# Patient Record
Sex: FEMALE | Race: WHITE | NOT HISPANIC OR LATINO | Employment: FULL TIME | URBAN - METROPOLITAN AREA
[De-identification: names, ages, dates, MRNs, and addresses within clinical notes are randomized per-mention and may not be internally consistent; named-entity substitution may affect disease eponyms.]

---

## 2019-03-14 ENCOUNTER — APPOINTMENT (OUTPATIENT)
Dept: GENERAL RADIOLOGY | Facility: HOSPITAL | Age: 34
End: 2019-03-14

## 2019-03-14 ENCOUNTER — HOSPITAL ENCOUNTER (EMERGENCY)
Facility: HOSPITAL | Age: 34
Discharge: HOME | End: 2019-03-14
Attending: EMERGENCY MEDICINE | Admitting: EMERGENCY MEDICINE

## 2019-03-14 VITALS
TEMPERATURE: 98.8 F | HEART RATE: 76 BPM | HEIGHT: 66 IN | BODY MASS INDEX: 20.89 KG/M2 | DIASTOLIC BLOOD PRESSURE: 59 MMHG | SYSTOLIC BLOOD PRESSURE: 114 MMHG | RESPIRATION RATE: 16 BRPM | WEIGHT: 130 LBS | OXYGEN SATURATION: 98 %

## 2019-03-14 DIAGNOSIS — S82.65XA CLOSED NONDISPLACED FRACTURE OF LATERAL MALLEOLUS OF LEFT FIBULA, INITIAL ENCOUNTER: Primary | ICD-10-CM

## 2019-03-14 PROCEDURE — 99283 EMERGENCY DEPT VISIT LOW MDM: CPT

## 2019-03-14 PROCEDURE — 73610 X-RAY EXAM OF ANKLE: CPT

## 2019-03-14 RX ORDER — ONDANSETRON 4 MG/1
4 TABLET, ORALLY DISINTEGRATING ORAL ONCE
Status: COMPLETED | OUTPATIENT
Start: 2019-03-14 | End: 2019-03-14

## 2019-03-14 RX ORDER — HYDROCODONE BITARTRATE AND ACETAMINOPHEN 5; 325 MG/1; MG/1
1 TABLET ORAL EVERY 6 HOURS PRN
Qty: 15 TABLET | Refills: 0 | Status: SHIPPED | OUTPATIENT
Start: 2019-03-14 | End: 2019-04-24 | Stop reason: SDUPTHER

## 2019-03-14 RX ORDER — HYDROCODONE BITARTRATE AND ACETAMINOPHEN 7.5; 325 MG/1; MG/1
1 TABLET ORAL ONCE
Status: COMPLETED | OUTPATIENT
Start: 2019-03-14 | End: 2019-03-14

## 2019-03-14 RX ADMIN — HYDROCODONE BITARTRATE AND ACETAMINOPHEN 1 TABLET: 7.5; 325 TABLET ORAL at 14:00

## 2019-03-14 RX ADMIN — ONDANSETRON 4 MG: 4 TABLET, ORALLY DISINTEGRATING ORAL at 14:00

## 2019-03-14 NOTE — ED PROVIDER NOTES
" EMERGENCY DEPARTMENT ENCOUNTER    CHIEF COMPLAINT  Chief Complaint: left ankle pain  History given by: patient  History limited by: none  Room Number: 07/07  PMD: Marcelo Angel CRNP      HPI:  Pt is a 34 y.o. female who presents complaining of L ankle pain that started one hour ago when she rolled her ankle. Pt states she was walking on a curb when she slipped off of it and rolled her L ankle. Pt states that she fell down, but denies hitting her head. Pt states that she has been able to \"hobble\" on the ankle since she injured it. Pt denies any numbness or tingling in the L leg.     Duration:  One hour  Onset: sudden  Timing: constant  Location: L ankle  Radiation: none  Quality: L ankle pain  Intensity/Severity: moderate  Progression: unchanged  Associated Symptoms: none  Aggravating Factors: placing weight on the ankle  Alleviating Factors: rest  Previous Episodes: none  Treatment before arrival: none    PAST MEDICAL HISTORY  Active Ambulatory Problems     Diagnosis Date Noted   • No Active Ambulatory Problems     Resolved Ambulatory Problems     Diagnosis Date Noted   • No Resolved Ambulatory Problems     Past Medical History:   Diagnosis Date   • Anxiety    • Migraine        PAST SURGICAL HISTORY  Past Surgical History:   Procedure Laterality Date   • DENTAL PROCEDURE         FAMILY HISTORY  History reviewed. No pertinent family history.    SOCIAL HISTORY  Social History     Socioeconomic History   • Marital status:      Spouse name: Not on file   • Number of children: Not on file   • Years of education: Not on file   • Highest education level: Not on file   Social Needs   • Financial resource strain: Not on file   • Food insecurity - worry: Not on file   • Food insecurity - inability: Not on file   • Transportation needs - medical: Not on file   • Transportation needs - non-medical: Not on file   Occupational History   • Not on file   Tobacco Use   • Smoking status: Never Smoker   Substance " and Sexual Activity   • Alcohol use: No     Frequency: Never   • Drug use: Defer   • Sexual activity: Defer   Other Topics Concern   • Not on file   Social History Narrative   • Not on file       ALLERGIES  Celexa [citalopram hydrobromide]; Fioricet [butalbital-apap-caffeine]; Lactose intolerance (gi); Macrobid [nitrofurantoin]; Other; Robaxin [methocarbamol]; Soma [carisoprodol]; Sulfa antibiotics; Sumatriptan; and Tramadol    REVIEW OF SYSTEMS  Review of Systems   Constitutional: Negative for fever.   HENT:        Pt denies hitting her head.    Respiratory: Negative for shortness of breath.    Cardiovascular: Negative for chest pain.   Musculoskeletal: Positive for arthralgias ( L ankle pain).   Neurological: Negative for numbness ( in the LLE).        Pt denies any tingling in the LLE.        PHYSICAL EXAM  ED Triage Vitals   Temp Heart Rate Resp BP SpO2   03/14/19 1334 03/14/19 1334 03/14/19 1334 03/14/19 1346 03/14/19 1334   98.8 °F (37.1 °C) 87 18 119/91 100 %      Temp src Heart Rate Source Patient Position BP Location FiO2 (%)   03/14/19 1334 03/14/19 1334 03/14/19 1346 03/14/19 1346 --   Tympanic Monitor Sitting Right arm        Physical Exam   Constitutional: She is oriented to person, place, and time. No distress.   Eyes: EOM are normal.   Neck: Normal range of motion.   Cardiovascular: Normal rate and regular rhythm.   Pulmonary/Chest: Effort normal and breath sounds normal. No respiratory distress.   Musculoskeletal:   There is no fibular head tenderness to the RLE.   LLE: The lateral ankle is swollen and tender. The calcaneus and foot are non-tender.    Neurological: She is alert and oriented to person, place, and time. She has normal sensation and normal strength.   Pt is NVI distally. Pt has good sensation in both feet bilaterally.    Skin: Skin is warm and dry.   Psychiatric: Affect normal.   Nursing note and vitals reviewed.        RADIOLOGY  XR Ankle 3+ View Left   Final Result       Fracture of  the distal fibula.       This report was finalized on 3/14/2019 2:50 PM by Dr. Sebastián Turner M.D.               I ordered the above noted radiological studies. Interpreted by radiologist. Reviewed by me in PACS.       PROCEDURES  Procedures    Splint Application:  Splint Type: short leg posterior ortho glass splint  Indication: fx  Splint placed by ER Physician  Post splint application:   1) neurovascularly intact   2) good position  Discussed splint care with patient  Discussed PMD/orthopedic follow up      PROGRESS AND CONSULTS     1354 Norco and Zofran-ODT ordered for pain management. XR L Ankle ordered for further evaluation.     1445 Rechecked pt. Pt is resting comfortably. Notified pt she has a fx to her L distal fibula. Discussed the plan to put the pt in a splint, pt agreed to the plan. I put the pt's LLE in a splint. Pt tolerated the procedure well with no complications. Discussed the plan to discharge the pt home with prescriptions for Norco. I instructed the pt to follow up with Dr. Boyce as soon as possible for an appointment. Pt understands and agrees with the plan, all questions answered.    MEDICAL DECISION MAKING  Results were reviewed/discussed with the patient and they were also made aware of online access. Pt also made aware that some labs, such as cultures, will not be resulted during ER visit and follow up with PMD is necessary.     MDM  Number of Diagnoses or Management Options  Closed nondisplaced fracture of lateral malleolus of left fibula, initial encounter:      Amount and/or Complexity of Data Reviewed  Tests in the radiology section of CPT®: reviewed and ordered (XR L ankle - Fracture of the distal fibula)  Independent visualization of images, tracings, or specimens: yes           DIAGNOSIS  Final diagnoses:   Closed nondisplaced fracture of lateral malleolus of left fibula, initial encounter       DISPOSITION  DISCHARGE    Patient discharged in stable condition.    Reviewed  implications of results, diagnosis, meds, responsibility to follow up, warning signs and symptoms of possible worsening, potential complications and reasons to return to ER.    Patient/Family voiced understanding of above instructions.    Discussed plan for discharge, as there is no emergent indication for admission. Patient referred to primary care provider for BP management due to today's BP. Pt/family is agreeable and understands need for follow up and repeat testing.  Pt is aware that discharge does not mean that nothing is wrong but it indicates no emergency is present that requires admission and they must continue care with follow-up as given below or physician of their choice.     FOLLOW-UP  Kervin Boyce MD  [address]  [place] [address] [phone]    Schedule an appointment as soon as possible for a visit       [facility] Emergency Department  [address] [address] [phone]    As needed         Medication List      New Prescriptions    HYDROcodone-acetaminophen 5-325 MG per tablet  Commonly known as:  NORCO  Take 1 tablet by mouth Every 6 (Six) Hours As Needed for Moderate Pain .              Latest Documented Vital Signs:  As of 3:00 PM  BP- 119/91 HR- 87 Temp- 98.8 °F (37.1 °C) (Tympanic) O2 sat- 100%    --  Documentation assistance provided by clovis Camejo for Dr. Weldon.  Information recorded by the scribe was done at my direction and has been verified and validated by me.     Juan Camejo  03/14/19 9841       Ruel Weldon MD  03/14/19 8195

## 2019-03-15 ENCOUNTER — OFFICE VISIT (OUTPATIENT)
Dept: ORTHOPEDIC SURGERY | Facility: CLINIC | Age: 34
End: 2019-03-15

## 2019-03-15 VITALS — WEIGHT: 130 LBS | TEMPERATURE: 97.9 F | HEIGHT: 66 IN | BODY MASS INDEX: 20.89 KG/M2

## 2019-03-15 DIAGNOSIS — S82.892A CLOSED FRACTURE OF LEFT ANKLE, INITIAL ENCOUNTER: Primary | ICD-10-CM

## 2019-03-15 PROCEDURE — 99204 OFFICE O/P NEW MOD 45 MIN: CPT | Performed by: ORTHOPAEDIC SURGERY

## 2019-03-15 PROCEDURE — 27786 TREATMENT OF ANKLE FRACTURE: CPT | Performed by: ORTHOPAEDIC SURGERY

## 2019-03-15 RX ORDER — HYDROCODONE BITARTRATE AND ACETAMINOPHEN 5; 325 MG/1; MG/1
1 TABLET ORAL EVERY 4 HOURS PRN
Qty: 30 TABLET | Refills: 0 | Status: SHIPPED | OUTPATIENT
Start: 2019-03-15 | End: 2020-01-07

## 2019-03-15 NOTE — PROGRESS NOTES
History & Physical       Patient: Alda Durham    YOB: 1985    Medical Record Number: [id number]    Chief Complaints: Left ankle injury    History of Present Illness: 34 y.o. female presents for evaluation of the left ankle.  The injury was sustained yesterday.  She was walking [place] [employer] and twisted her ankle.  She was seen in the emergency room and subsequently referred here.  She reports good use and function of the ankle prior to the injury.  She does not typically walk with any assist device.  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  Current pain is described as moderate, constant, and aching.  Pain is worse with attempted movement or weightbearing.  Rest, the splint, and pain medicine and have all helped.      Allergies:   Allergies   Allergen Reactions   • Celexa [Citalopram Hydrobromide] Swelling   • Fioricet [Butalbital-Apap-Caffeine] Swelling   • Lactose Intolerance (Gi) GI Intolerance   • Macrobid [Nitrofurantoin] Swelling   • Other Swelling     Muscle relaxers   • Robaxin [Methocarbamol] Swelling   • Soma [Carisoprodol] Swelling   • Sulfa Antibiotics Swelling   • Sumatriptan Swelling   • Tramadol Swelling       Home Medications:      Current Outpatient Medications:   •  HYDROcodone-acetaminophen (NORCO) 5-325 MG per tablet, Take 1 tablet by mouth Every 6 (Six) Hours As Needed for Moderate Pain ., Disp: 15 tablet, Rfl: 0  •  HYDROcodone-acetaminophen (NORCO) 5-325 MG per tablet, Take 1 tablet by mouth Every 4 (Four) Hours As Needed for Moderate Pain ., Disp: 30 tablet, Rfl: 0  No current facility-administered medications for this visit.     Past Medical History:   Diagnosis Date   • Anxiety    • Migraine           Past Surgical History:   Procedure Laterality Date   • DENTAL PROCEDURE            Social History     Occupational History   • Not on file   Tobacco Use   • Smoking status: Never Smoker   Substance and Sexual Activity   •  "Alcohol use: No     Frequency: Never   • Drug use: Defer   • Sexual activity: Defer      Social History     Social History Narrative   • Not on file        History reviewed. No pertinent family history.    Review of Systems:      Constitutional: Denies fever, shaking or chills   Eyes: Denies change in visual acuity   HEENT: Denies nasal congestion or sore throat   Respiratory: Denies cough or shortness of breath   Cardiovascular: Denies chest pain or edema  Endocrine: Denies tremors, palpitations, intolerance of heat or cold, polyuria, polydipsia.  GI: Denies abdominal pain, nausea, vomiting, bloody stools or diarrhea  : Denies frequency, urgency, incontinence, retention, or nocturia.  Musculoskeletal: Denies numbness tingling or loss of motor function except as above  Integument: Denies rash, lesion or ulceration   Neurologic: Denies headache or focal weakness, deficits  Heme: Denies epistaxis, spontaneous or excessive bleeding, epistaxis, hematuria, melena, fatigue, enlarged or tender lymph nodes.      All other pertinent positives and negatives as noted above in HPI.    Physical Exam: 34 y.o. female    Vitals:    03/15/19 0913   Temp: 97.9 °F (36.6 °C)   Weight: 59 kg (130 lb)   Height: 167.6 cm (66\")       General:  Patient is awake and alert.  Appears in no acute distress or discomfort.    Psych:  Affect and demeanor are appropriate.    Eyes:  Conjunctiva and sclera appear grossly normal.  Eyes track well and EOM seem to be intact.    Dentition:  No gross abnormalities noted.    Ears:  No gross abnormalities.  Hearing adequate for the exam.    Cardiovascular:  Regular rate and rhythm.    Lungs:  Good chest expansion.  Breathing unlabored.    Lymph:  No palpable masses or adenopathy in the affected extremity    Left lower extremity:  Splint was in place and removed.  There is diffuse edema laterally. Skin appears benign.  No lacerations or abrasions.  Focal tenderness noted over the lateral malleolus.  There " are no palpable step-offs.  No tenderness along the medial side of the ankle, upper leg or knee.  No palpable masses or adenopathy.  Compartments soft in the calf and foot.  Painful, limited ROM of the ankle.  Could not assess stability due to discomfort with motion.  Good strength in the toes with plantar flexion and dorsiflexion albeit with discomfort.  Intact sensation.  Brisk cap refill.  Palpable dorsalis pedis pulse.  Toes are pink and warm.    Diagnostic Tests:  No results found for: GLUCOSE, CALCIUM, NA, K, CO2, CL, BUN, CREATININE, EGFRIFAFRI, EGFRIFNONA, BCR, ANIONGAP  No results found for: WBC, HGB, HCT, MCV, PLT  No results found for: INR, PROTIME    Imaging:  Outside AP, mortise and lateral views of the left ankle are reviewed along with the associated report.  There is a Lee A type lateral malleolar fracture.  The mortise is symmetric.  No medial clear space widening.    Assessment:  Left ankle lateral malleolar fracture    Plan:  We had a thorough discussion regarding the treatment options.  This appears to be a stable injury which should be amenable to conservative treatment.  I have recommended a boot for comfort.  The injury is stable to allow partial weightbearing.  No prolonged standing or walking.  I gave her a work note to keep her out of work for now..  I want to reconvene in approximately 7-10 days for repeat x-rays.  I did agree to refill her hydrocodone prescription.  Risks were discussed.    Date: 3/15/2019    Kervin Boyce MD

## 2019-03-26 ENCOUNTER — OFFICE VISIT (OUTPATIENT)
Dept: OBSTETRICS AND GYNECOLOGY | Facility: CLINIC | Age: 34
End: 2019-03-26

## 2019-03-26 VITALS
DIASTOLIC BLOOD PRESSURE: 82 MMHG | WEIGHT: 130 LBS | BODY MASS INDEX: 20.89 KG/M2 | HEIGHT: 66 IN | SYSTOLIC BLOOD PRESSURE: 118 MMHG

## 2019-03-26 DIAGNOSIS — Z11.3 SCREENING EXAMINATION FOR STD (SEXUALLY TRANSMITTED DISEASE): ICD-10-CM

## 2019-03-26 DIAGNOSIS — Z01.419 WELL WOMAN EXAM WITH ROUTINE GYNECOLOGICAL EXAM: Primary | ICD-10-CM

## 2019-03-26 LAB
B-HCG UR QL: NEGATIVE
BILIRUB BLD-MCNC: NEGATIVE MG/DL
CLARITY, POC: CLEAR
COLOR UR: YELLOW
GLUCOSE UR STRIP-MCNC: NEGATIVE MG/DL
INTERNAL NEGATIVE CONTROL: NEGATIVE
INTERNAL POSITIVE CONTROL: POSITIVE
KETONES UR QL: NEGATIVE
LEUKOCYTE EST, POC: NEGATIVE
Lab: NORMAL
NITRITE UR-MCNC: NEGATIVE MG/ML
PH UR: 5 [PH] (ref 5–8)
PROT UR STRIP-MCNC: NEGATIVE MG/DL
RBC # UR STRIP: NEGATIVE /UL
SP GR UR: 1 (ref 1–1.03)
UROBILINOGEN UR QL: NORMAL

## 2019-03-26 PROCEDURE — 81002 URINALYSIS NONAUTO W/O SCOPE: CPT | Performed by: OBSTETRICS & GYNECOLOGY

## 2019-03-26 PROCEDURE — 99385 PREV VISIT NEW AGE 18-39: CPT | Performed by: OBSTETRICS & GYNECOLOGY

## 2019-03-26 PROCEDURE — 81025 URINE PREGNANCY TEST: CPT | Performed by: OBSTETRICS & GYNECOLOGY

## 2019-03-26 NOTE — PROGRESS NOTES
GYN Annual Exam     CC- Here for annual exam.     Alda Durham is a 34 y.o. female who presents for annual well woman exam. Periods are regular every 28-30 days, lasting 5 days. Dysmenorrhea:mild, occurring first 1-2 days of flow. Cyclic symptoms include none. No intermenstrual bleeding, spotting, or discharge.  Patient is sexually active  yes - recently  . Patient is satisfied with her contraception pt is interested in Paragard IUD. Pt requesting STD testing-her  was unfaithful.    OB History     No data available        G0    Current contraception: condoms  History of abnormal Pap smear: no  History of abnormal mammogram: no  Family history of uterine, colon or ovarian cancer: no  Family history of breast cancer: yes - maternal GM over age 50.    Health Maintenance   Topic Date Due   • ANNUAL PHYSICAL  01/22/1986   • TDAP/TD VACCINES (1 - Tdap) 01/22/2002   • INFLUENZA VACCINE  08/01/2018   • PAP SMEAR  03/15/2019       Past Medical History:   Diagnosis Date   • Anxiety    • Migraine        Past Surgical History:   Procedure Laterality Date   • DENTAL PROCEDURE           Current Outpatient Medications:   •  HYDROcodone-acetaminophen (NORCO) 5-325 MG per tablet, Take 1 tablet by mouth Every 6 (Six) Hours As Needed for Moderate Pain ., Disp: 15 tablet, Rfl: 0  •  HYDROcodone-acetaminophen (NORCO) 5-325 MG per tablet, Take 1 tablet by mouth Every 4 (Four) Hours As Needed for Moderate Pain ., Disp: 30 tablet, Rfl: 0    Allergies   Allergen Reactions   • Celexa [Citalopram Hydrobromide] Swelling   • Fioricet [Butalbital-Apap-Caffeine] Swelling   • Lactose Intolerance (Gi) GI Intolerance   • Macrobid [Nitrofurantoin] Swelling   • Other Swelling     Muscle relaxers   • Robaxin [Methocarbamol] Swelling   • Soma [Carisoprodol] Swelling   • Sulfa Antibiotics Swelling   • Sumatriptan Swelling   • Tramadol Swelling       Social History     Tobacco Use   • Smoking status: Never Smoker   Substance Use Topics   •  "Alcohol use: No     Frequency: Never   • Drug use: Defer       History reviewed. No pertinent family history.    Review of Systems   Constitutional: Negative for unexpected weight change.   Gastrointestinal: Negative for abdominal distention and abdominal pain.   Genitourinary: Negative for dyspareunia, menstrual problem, pelvic pain, vaginal bleeding, vaginal discharge and vaginal pain.       /82   Ht 167.6 cm (66\")   Wt 59 kg (130 lb)   LMP 03/07/2019   BMI 20.98 kg/m²     Physical Exam   Constitutional: She is oriented to person, place, and time. She appears well-developed and well-nourished.   HENT:   Mouth/Throat: Normal dentition. No dental caries.   Cardiovascular: Normal rate and regular rhythm.   Pulmonary/Chest: Effort normal and breath sounds normal. Right breast exhibits no inverted nipple, no mass, no nipple discharge, no skin change and no tenderness. Left breast exhibits no inverted nipple, no mass, no nipple discharge, no skin change and no tenderness.   Abdominal: Soft. She exhibits no distension and no mass. There is no tenderness.   Genitourinary: There is no rash, tenderness or lesion on the right labia. There is no rash, tenderness or lesion on the left labia. Uterus is not deviated, not enlarged, not fixed and not tender. Cervix exhibits no motion tenderness, no discharge and no friability. Right adnexum displays no mass, no tenderness and no fullness. Left adnexum displays no mass, no tenderness and no fullness. No tenderness or bleeding in the vagina. No vaginal discharge found.   Neurological: She is alert and oriented to person, place, and time.   Psychiatric: She has a normal mood and affect. Her behavior is normal. Judgment and thought content normal.   Vitals reviewed.         Assessment/Plan    1) GYN HM: Check pap smear. SBE demonstrated and encouraged.  2) STD screening: Check full panel  3) Contraception: Interested in Paragard  4) Family Planning: Not interested in " children.  5) Diet and Exercise discussed  6) Smoking Status: nonsmoker.   7) Social: Recently  and moved here from [place].  was unfaithful: was communicating with a she-male. Pt's mother is my patient.  8) Follow up prn and 1 year       Diagnoses and all orders for this visit:    Well woman exam with routine gynecological exam  -     POC Urinalysis Dipstick  -     POC Pregnancy, Urine  -     RPR, Rfx Qn RPR / Confirm TP  -     Hepatitis B Surface Antigen  -     Hepatitis C Antibody  -     HIV-1 / O / 2 Ag / Antibody 4th Generation  -     HSV 1 & 2 - Specific Antibody, IgG  -     PapIG, CtNgTv, HPV, Rfx 16 / 18    Screening examination for STD (sexually transmitted disease)          Payal Omalley DO  3/27/2019  1:29 PM

## 2019-03-27 ENCOUNTER — TELEPHONE (OUTPATIENT)
Dept: OBSTETRICS AND GYNECOLOGY | Facility: CLINIC | Age: 34
End: 2019-03-27

## 2019-03-27 LAB
HBV SURFACE AG SERPL QL IA: NEGATIVE
HCV AB S/CO SERPL IA: 0.1 S/CO RATIO (ref 0–0.9)
HIV 1+2 AB+HIV1 P24 AG SERPL QL IA: NORMAL
HSV1 IGG SER IA-ACNC: <0.91 INDEX (ref 0–0.9)
HSV2 IGG SER IA-ACNC: <0.91 INDEX (ref 0–0.9)
REQUEST PROBLEM: NORMAL
RPR SER QL: NON REACTIVE

## 2019-03-29 LAB
C TRACH RRNA CVX QL NAA+PROBE: NEGATIVE
CYTOLOGIST CVX/VAG CYTO: NORMAL
CYTOLOGY CVX/VAG DOC THIN PREP: NORMAL
DX ICD CODE: NORMAL
HIV 1 & 2 AB SER-IMP: NORMAL
HPV I/H RISK 1 DNA CVX QL PROBE+SIG AMP: NEGATIVE
Lab: NORMAL
N GONORRHOEA RRNA CVX QL NAA+PROBE: NEGATIVE
OTHER STN SPEC: NORMAL
PATH REPORT.FINAL DX SPEC: NORMAL
STAT OF ADQ CVX/VAG CYTO-IMP: NORMAL
T VAGINALIS RRNA SPEC QL NAA+PROBE: NEGATIVE

## 2019-04-03 ENCOUNTER — OFFICE VISIT (OUTPATIENT)
Dept: ORTHOPEDIC SURGERY | Facility: CLINIC | Age: 34
End: 2019-04-03

## 2019-04-03 VITALS — HEIGHT: 66 IN | BODY MASS INDEX: 20.89 KG/M2 | TEMPERATURE: 98.5 F | WEIGHT: 130 LBS

## 2019-04-03 DIAGNOSIS — Z09 FRACTURE FOLLOW-UP: Primary | ICD-10-CM

## 2019-04-03 PROCEDURE — 73610 X-RAY EXAM OF ANKLE: CPT | Performed by: ORTHOPAEDIC SURGERY

## 2019-04-03 PROCEDURE — 99024 POSTOP FOLLOW-UP VISIT: CPT | Performed by: ORTHOPAEDIC SURGERY

## 2019-04-08 ENCOUNTER — TELEPHONE (OUTPATIENT)
Dept: ORTHOPEDIC SURGERY | Facility: CLINIC | Age: 34
End: 2019-04-08

## 2019-04-08 NOTE — TELEPHONE ENCOUNTER
Patient needs a detailed note for her employer regarding limitations if any and how many hrs a day that she is able to work. Letter needs to be faxed to attention: Felicia Nagy @ [phone]. Please call patient when letter has been faxed.

## 2019-04-08 NOTE — TELEPHONE ENCOUNTER
Sit down job only.  No standing or walking more than 30 minutes/day.  She can work up to 8 hours/day.  No driving.

## 2019-04-11 ENCOUNTER — LAB (OUTPATIENT)
Dept: OBSTETRICS AND GYNECOLOGY | Facility: CLINIC | Age: 34
End: 2019-04-11

## 2019-04-11 DIAGNOSIS — Z20.2 POSSIBLE EXPOSURE TO STD: Primary | ICD-10-CM

## 2019-04-11 DIAGNOSIS — Z11.3 SCREENING EXAMINATION FOR STD (SEXUALLY TRANSMITTED DISEASE): ICD-10-CM

## 2019-04-11 DIAGNOSIS — Z01.419 WELL WOMAN EXAM WITH ROUTINE GYNECOLOGICAL EXAM: ICD-10-CM

## 2019-04-12 LAB — HIV 1+2 AB+HIV1 P24 AG SERPL QL IA: NON REACTIVE

## 2019-04-24 ENCOUNTER — OFFICE VISIT (OUTPATIENT)
Dept: ORTHOPEDIC SURGERY | Facility: CLINIC | Age: 34
End: 2019-04-24

## 2019-04-24 VITALS — BODY MASS INDEX: 20.89 KG/M2 | WEIGHT: 130 LBS | TEMPERATURE: 98.2 F | HEIGHT: 66 IN

## 2019-04-24 DIAGNOSIS — Z09 FRACTURE FOLLOW-UP: Primary | ICD-10-CM

## 2019-04-24 PROCEDURE — 73610 X-RAY EXAM OF ANKLE: CPT | Performed by: ORTHOPAEDIC SURGERY

## 2019-04-24 PROCEDURE — 99024 POSTOP FOLLOW-UP VISIT: CPT | Performed by: ORTHOPAEDIC SURGERY

## 2019-04-24 NOTE — PROGRESS NOTES
"Alda Durham : 1985 MRN: [id number] DATE: 2019    CC:  6 weeks s/p closed treatment of left ankle fracture    HPI:  Pt. returns to clinic today stating pain is improved.  Reports compliance with use of the boot.    Vitals:    19 0901   Temp: 98.2 °F (36.8 °C)   TempSrc: Temporal   Weight: 59 kg (130 lb)   Height: 167.6 cm (66\")        Exam:  Skin is benign.  No wounds.  Calf soft.  Negative Sonia's sign.  Mild tenderness over lateral malleolus.  Good motor and sensory function distally in foot.  Palpable pulses with good cap refill.      Imaging:  3v X-rays of left ankle including AP, lateral and mortise views are ordered and reviewed by me to evaluate alignment and for comparison purposes. Alignment is well maintained.  No concerning findings.    Impression:  6 weeks s/p closed treatment of left ankle fracture    Plan:  I had hoped to fit her with an Aircast today.  Unfortunately, we are out of those in the office here.  I will have Angelito reach out to her about getting her fitted for that.  She can continue to bear weight as tolerated and can transition to regular shoes with the Aircast.  I will see her back in 1 month.  "

## 2019-05-22 ENCOUNTER — OFFICE VISIT (OUTPATIENT)
Dept: ORTHOPEDIC SURGERY | Facility: CLINIC | Age: 34
End: 2019-05-22

## 2019-05-22 VITALS — BODY MASS INDEX: 22.08 KG/M2 | HEIGHT: 66 IN | WEIGHT: 137.4 LBS | TEMPERATURE: 98.9 F

## 2019-05-22 DIAGNOSIS — S82.892D CLOSED FRACTURE OF LEFT ANKLE WITH ROUTINE HEALING, SUBSEQUENT ENCOUNTER: ICD-10-CM

## 2019-05-22 DIAGNOSIS — Z09 FRACTURE FOLLOW-UP: Primary | ICD-10-CM

## 2019-05-22 PROCEDURE — 73610 X-RAY EXAM OF ANKLE: CPT | Performed by: NURSE PRACTITIONER

## 2019-05-22 PROCEDURE — 99024 POSTOP FOLLOW-UP VISIT: CPT | Performed by: NURSE PRACTITIONER

## 2019-05-23 NOTE — PROGRESS NOTES
Alda Durham : 1985 MRN: [id number] DATE: 2019    CC:  2 months s/p closed treatment left ankle fracture    HPI: Pt. returns to clinic today stating pain is much better.  Motion is progressing.  Denies any new concerns or issues.  Reports she was not able to use the brace provided in her last visit due to discomfort.    Vitals:    19 1118   Temp: 98.9 °F (37.2 °C)       Current Outpatient Medications:   •  Vortioxetine HBr (TRINTELLIX) 10 MG tablet, , Disp: , Rfl:   •  HYDROcodone-acetaminophen (NORCO) 5-325 MG per tablet, Take 1 tablet by mouth Every 4 (Four) Hours As Needed for Moderate Pain ., Disp: 30 tablet, Rfl: 0    Past Medical History:   Diagnosis Date   • Anxiety    • Asthma    • CTS (carpal tunnel syndrome)    • Fractures    • Migraine        Past Surgical History:   Procedure Laterality Date   • DENTAL PROCEDURE         Family History   Problem Relation Age of Onset   • Cancer Maternal Grandmother    • Osteoporosis Maternal Grandmother    • Clotting disorder Mother         DVT   • Diabetes Paternal Grandmother    • Diabetes Paternal Grandfather        Social History     Socioeconomic History   • Marital status: Single     Spouse name: Not on file   • Number of children: Not on file   • Years of education: Not on file   • Highest education level: Not on file   Tobacco Use   • Smoking status: Former Smoker     Years: 1.00     Start date: 2001     Last attempt to quit: 2002     Years since quittin.3   • Smokeless tobacco: Never Used   • Tobacco comment: Social smoker   Substance and Sexual Activity   • Alcohol use: No     Frequency: Never   • Drug use: No   • Sexual activity: Yes     Partners: Male     Birth control/protection: Coitus interruptus, Condom, Sponge       Exam:  Contour of ankle appears normal.  No significant tenderness.  Ankle moves fluidly--some stiffness with dorsiflexion.  Good motor and sensory function distally.  Palpable pulses with good  cap refill.      Imaging:  Dr. Boyce and I reviewed the x-rays together.  3v xrays left ankle including AP, lateral and mortise views are ordered and reviewed by me to evaluate alignment and for comparison purposes. There has been interval callus formation laterally.  Alignment is well maintained.    Impression:  2 months s/p closed treatment left ankle fracture    Plan:    1.  Progress ROM and strengthening as tolerated.  2.  Encouraged the patient that residual soreness and swelling may persist up to 6 months after surgery.  3.  I have entered a referral for physical therapy.   4.  Follow up as needed--told patient to call if any problems or concerns arise.    Yelitza Sin, APRN    05/22/2019

## 2020-01-07 ENCOUNTER — OFFICE VISIT (OUTPATIENT)
Dept: OBSTETRICS AND GYNECOLOGY | Facility: CLINIC | Age: 35
End: 2020-01-07

## 2020-01-07 VITALS
HEIGHT: 66 IN | DIASTOLIC BLOOD PRESSURE: 82 MMHG | BODY MASS INDEX: 22.02 KG/M2 | WEIGHT: 137 LBS | SYSTOLIC BLOOD PRESSURE: 120 MMHG

## 2020-01-07 DIAGNOSIS — Z30.9 ENCOUNTER FOR CONTRACEPTIVE MANAGEMENT, UNSPECIFIED TYPE: Primary | ICD-10-CM

## 2020-01-07 DIAGNOSIS — Z13.9 SCREENING FOR CONDITION: ICD-10-CM

## 2020-01-07 PROBLEM — G43.109 MIGRAINE WITH AURA: Status: ACTIVE | Noted: 2020-01-07

## 2020-01-07 LAB
B-HCG UR QL: NEGATIVE
BILIRUB BLD-MCNC: NEGATIVE MG/DL
CLARITY, POC: CLEAR
COLOR UR: YELLOW
GLUCOSE UR STRIP-MCNC: NEGATIVE MG/DL
INTERNAL NEGATIVE CONTROL: NEGATIVE
INTERNAL POSITIVE CONTROL: POSITIVE
KETONES UR QL: NEGATIVE
LEUKOCYTE EST, POC: NEGATIVE
Lab: 55
NITRITE UR-MCNC: NEGATIVE MG/ML
PH UR: 5 [PH] (ref 5–8)
PROT UR STRIP-MCNC: NEGATIVE MG/DL
RBC # UR STRIP: NEGATIVE /UL
SP GR UR: 1 (ref 1–1.03)
UROBILINOGEN UR QL: NORMAL

## 2020-01-07 PROCEDURE — 81002 URINALYSIS NONAUTO W/O SCOPE: CPT | Performed by: OBSTETRICS & GYNECOLOGY

## 2020-01-07 PROCEDURE — 81025 URINE PREGNANCY TEST: CPT | Performed by: OBSTETRICS & GYNECOLOGY

## 2020-01-07 PROCEDURE — 99213 OFFICE O/P EST LOW 20 MIN: CPT | Performed by: OBSTETRICS & GYNECOLOGY

## 2020-01-07 RX ORDER — ACETAMINOPHEN AND CODEINE PHOSPHATE 120; 12 MG/5ML; MG/5ML
1 SOLUTION ORAL DAILY
Qty: 28 TABLET | Refills: 12 | Status: SHIPPED | OUTPATIENT
Start: 2020-01-07 | End: 2020-03-09

## 2020-01-07 NOTE — PROGRESS NOTES
"PROBLEM VISIT    Chief Complaint: contraception      Alda MCCORMICK is a 34 y.o. patient who presents to discuss contraception. Pt has a diagnosis of migraine with aura- she takes trintellix daily. Pt desired paragard IUD but insurance does not cover it. Her insurance does call progesterone IUDs.  Patient is concerned about starting progesterone as she is worried is going to start a migraine.  She is been on the Tri-Cyclen low in the past and it exacerbated her migraines.  Patient is not currently sexually active.  She moved here approximately 2 years ago and is now .  Her  had a vasectomy.  Patient does not have any children and she plans to start dating and does not desire pregnancy.  Chief Complaint   Patient presents with   • Consult     BC             The following portions of the patient's history were reviewed and updated as appropriate: allergies, current medications and problem list.    Review of Systems   Constitutional: Negative for appetite change, chills, fatigue, fever and unexpected weight change.   Gastrointestinal: Negative for abdominal distention, abdominal pain, anal bleeding, blood in stool, constipation, diarrhea, nausea and vomiting.   Genitourinary: Negative for dyspareunia, dysuria, menstrual problem, pelvic pain, vaginal bleeding, vaginal discharge and vaginal pain.   Neurological: Positive for headaches.       /82   Ht 167.6 cm (65.98\")   Wt 62.1 kg (137 lb)   LMP 12/10/2019 (Exact Date)   Breastfeeding No   BMI 22.12 kg/m²     Physical Exam   Constitutional: She is oriented to person, place, and time. She appears well-developed and well-nourished. No distress.   Neurological: She is alert and oriented to person, place, and time. No cranial nerve deficit. Coordination normal.   Skin: She is not diaphoretic.   Psychiatric: She has a normal mood and affect. Her behavior is normal. Judgment and thought content normal.   Vitals reviewed.        Assessment/Plan   Alda " was seen today for consult.    Diagnoses and all orders for this visit:    Encounter for contraceptive management, unspecified type    Screening for condition  -     POC Urinalysis Dipstick  -     POC Pregnancy, Urine    Other orders  -     norethindrone (MICRONOR) 0.35 MG tablet; Take 1 tablet by mouth Daily.    33yo for contraception management    1) Contraception: Patient is currently on no contraception.  She desires a ParaGard IUD but her insurance company would not cover it.  Her insurance company does cover progesterone secreting IUDs.  Patient has a history of migraine with aura and she does not know she can tolerate progesterone only devices.  Will start patient on Micronor with her next cycle to see if she is a candidate for a progesterone secreting IUD.  Patient will notify me if she hires to proceed with an IUD.  She was given information about Tabby, Kyleena, Mirena.  If patient does not tolerate the Micronor then will discuss with her insurance company about trying to cover the ParaGard IUD as she is not a candidate for any sort of hormone contraception.    2) gyn HM: [facility] 3/2019             No follow-ups on file.      Payal Omalley DO    1/7/2020  10:16 AM

## 2020-02-02 ENCOUNTER — TELEPHONE (OUTPATIENT)
Dept: OBSTETRICS AND GYNECOLOGY | Facility: CLINIC | Age: 35
End: 2020-02-02

## 2020-02-02 NOTE — TELEPHONE ENCOUNTER
This pt wants an IUD (Tabby) but has a severe casein allergy. Can we call the rep for the IUD and find out if this device has casein?

## 2020-02-05 ENCOUNTER — TELEPHONE (OUTPATIENT)
Dept: OBSTETRICS AND GYNECOLOGY | Facility: CLINIC | Age: 35
End: 2020-02-05

## 2020-02-11 NOTE — TELEPHONE ENCOUNTER
She called back and gave me a website to go to and submit my question. This has been submitted and waiting on a reply back.

## 2020-02-14 NOTE — TELEPHONE ENCOUNTER
There is no Casein in the CoworkingON device. I will scan the information provided to me for that. I called the patient and left her that information on her voice mail and she could call and schedule an appt.

## 2020-03-09 ENCOUNTER — OFFICE VISIT (OUTPATIENT)
Dept: OBSTETRICS AND GYNECOLOGY | Facility: CLINIC | Age: 35
End: 2020-03-09

## 2020-03-09 VITALS
BODY MASS INDEX: 22.18 KG/M2 | SYSTOLIC BLOOD PRESSURE: 124 MMHG | DIASTOLIC BLOOD PRESSURE: 76 MMHG | HEIGHT: 66 IN | WEIGHT: 138 LBS

## 2020-03-09 DIAGNOSIS — Z30.430 ENCOUNTER FOR IUD INSERTION: Primary | ICD-10-CM

## 2020-03-09 PROCEDURE — 58300 INSERT INTRAUTERINE DEVICE: CPT | Performed by: OBSTETRICS & GYNECOLOGY

## 2020-03-09 RX ORDER — FEXOFENADINE HCL AND PSEUDOEPHEDRINE HCI 180; 240 MG/1; MG/1
1 TABLET, EXTENDED RELEASE ORAL DAILY
COMMUNITY

## 2020-03-09 NOTE — PROGRESS NOTES
Procedure: Intrauterine device insertion    Chief Complaint: IUD insertion    Procedures    Pre procedure indication 1) Desires LUIS  Post procedure indication 1) Desires same    The risks, benefits, and alternatives to IUD were explained at length with the patient. All her questions were answered and consents were signed.    The patient was placed in a dorsal lithotomy position on the examining table in [facility]. A bimanual exam confirmed the uterus was normal in size, anteverted. A warmed metal speculum was inserted into the vagina and the cervix was brought into view.    The cervix was prepped with Betadine. The anterior lip was grasped with a single-tooth tenaculum. The endometrial cavity was then sounded to 7 cm without use of a dilator.     The  was then carefully advanced to the cervical canal into the uterus to the level of the fundus. This was then backed off about 1.5-2 cm to allow sufficient space for the arms to open. The device was deployed. The  was removed carefully from the uterus. The threads were then cut leaving 2-3 cm visible outside of the cervix.  The single-tooth tenaculum was removed from the anterior lip. Good hemostasis was noted.     All other instruments were removed from the vagina.   There were no complications.  The patient tolerated the procedure well with a minimal amount of discomfort.    The patient was counseled about the need to return in 4 weeks for string check.     She was counseled about the need to use a backup method of contraception such as condoms for 1-2 weeks. The patient is counseled to contact us if she has any significant or increasing bleeding, pain, fever, chills, or other concerns. She is instructed to see a doctor right away if she believes that she may be pregnant at any time with the IUD in place.    Alda was seen today for constipation.    Diagnoses and all orders for this visit:    Encounter for IUD insertion        RTO Return in about 4  weeks (around 4/6/2020) for Recheck.    Luigi Berry MD    3/9/2020  12:30 PM

## 2020-03-13 ENCOUNTER — OFFICE VISIT (OUTPATIENT)
Dept: OBSTETRICS AND GYNECOLOGY | Facility: CLINIC | Age: 35
End: 2020-03-13

## 2020-03-13 VITALS
HEIGHT: 66 IN | WEIGHT: 138 LBS | SYSTOLIC BLOOD PRESSURE: 118 MMHG | DIASTOLIC BLOOD PRESSURE: 82 MMHG | BODY MASS INDEX: 22.18 KG/M2

## 2020-03-13 DIAGNOSIS — Z30.09 ENCOUNTER FOR COUNSELING REGARDING CONTRACEPTION: ICD-10-CM

## 2020-03-13 DIAGNOSIS — G43.109 MIGRAINE WITH AURA AND WITHOUT STATUS MIGRAINOSUS, NOT INTRACTABLE: ICD-10-CM

## 2020-03-13 DIAGNOSIS — Z30.432 ENCOUNTER FOR IUD REMOVAL: Primary | ICD-10-CM

## 2020-03-13 PROCEDURE — 58301 REMOVE INTRAUTERINE DEVICE: CPT | Performed by: NURSE PRACTITIONER

## 2020-03-13 PROCEDURE — 99213 OFFICE O/P EST LOW 20 MIN: CPT | Performed by: NURSE PRACTITIONER

## 2020-03-13 NOTE — PROGRESS NOTES
"Subjective     Chief Complaint   Patient presents with   • Follow-up     IUD removal       Alda MCCORMICK is a 35 y.o. No obstetric history on file. whose LMP is Patient's last menstrual period was 03/04/2020 (exact date). She is an established patient but new to me. She presents today for IUD removal. She reports she has a history of migraine with aura.  She desired the ParaGard IUD but her insurance would not cover so she tried the Tabby.  She states she got the Tabby IUD placed on Monday, March 9 and within 2 days she had what she described as a severe migraine. She reports her migraine has since resolved and this was not like a migraine she has had in the past. States she had aura as usual but her thought process was not normal. States she could not process words or thoughts as normal and she had (R) sided weakness.  She did not seek emergency care.  She is not current with a neuro.     She has recently tried the sponge, condoms, and this IUD. In the past she has been on OCPs in the past but failed d/t frequent migraines. She moved here approximately 2 years ago and is now .  Her previous  had a vasectomy.  Patient does not have any children and she plans to start dating and does not desire pregnancy.    HPI    HPI    The following portions of the patient's history were reviewed and updated as appropriate:vital signs, allergies, current medications, past medical history, past social history, past surgical history and problem list      Review of Systems     Review of Systems   Constitutional: Negative.    Respiratory: Negative.    Cardiovascular: Negative.    Gastrointestinal: Negative.    Genitourinary: Negative.    Musculoskeletal: Negative.    Skin: Negative.    Neurological: Positive for headache.       Objective      /82   Ht 167.6 cm (66\")   Wt 62.6 kg (138 lb)   LMP 03/04/2020 (Exact Date)   BMI 22.27 kg/m²     Physical Exam    Physical Exam   Constitutional: She is oriented to " person, place, and time. She appears well-developed and well-nourished.   Abdominal: Soft. Bowel sounds are normal. Hernia confirmed negative in the right inguinal area and confirmed negative in the left inguinal area.   Genitourinary: Rectum normal. Pelvic exam was performed with patient supine. There is no rash, tenderness, lesion or injury on the right labia. There is no rash, tenderness, lesion or injury on the left labia. Uterus is not deviated, not enlarged, not fixed and not tender. Cervix exhibits no motion tenderness, no discharge and no friability. Right adnexum displays no mass, no tenderness and no fullness. Left adnexum displays no mass, no tenderness and no fullness. No erythema, tenderness or bleeding in the vagina. No foreign body in the vagina. No signs of injury around the vagina. No vaginal discharge found.   Genitourinary Comments: Type of IUD:  Tabby  Date of insertion:  3/9/20  Reason for removal:  Side effect: Migraine  Other relevant history/information:  none    Procedure Time Out Documentation      Procedure Details  IUD strings visible:  yes  Local anesthesia:  None  Tenaculum used:  None  Removal:  IUD strings grasped and IUD removed intact with gentle traction.  The patient tolerated the procedure well.    All appropriate instructions regarding removal were reviewed.    Tolerated well  No apparent complications  Post procedure diagnosis : IUD removal     Plans for contraception:  no method    The patient was advised to call for any fever or for prolonged or severe pain or bleeding. She was advised to use NSAIDS as needed for mild to moderate pain.      Musculoskeletal: Normal range of motion. She exhibits no edema.   Lymphadenopathy:        Right: No inguinal adenopathy present.        Left: No inguinal adenopathy present.   Neurological: She is alert and oriented to person, place, and time.   Skin: Skin is warm and dry.   Psychiatric: She has a normal mood and affect. Her behavior is  normal.   Vitals reviewed.      Lab Review   Labs: Urine pregnancy test     Imaging   No data reviewed    Assessment  There are no diagnoses linked to this encounter.    Additional Assessment:   1. IUD removal  2. Contraception counseling    Plan     1. IUD removal- Pt tolerated well.  She understands to return to fertility is immediate.  She plans on condoms at this time.  2. Contraception counseling-she desires a ParaGard.  She has been on pills as well in the past in the past and her headaches worsen.  She is very interested in a nonhormonal form of contraception.  Her previous partner had a vasectomy but she is recently  and needs pregnancy prevention.  She does not desire children.  Discussed the possibility of tubal ligation with endometrial ablation.  Will we will try to write a letter of necessity to her insurance to see if they will cover the ParaGard for her.  3. Scheduled for: STD Labs - N/A , Ultrasound of the -  N/A , Mammography - N/A , Bone Density Test - N/A , Additional Labs - N/A  4. STD:  Enc condom use.   5. Smoking status: non smoker  6.   BMI: Patient's Body mass index is 22.27 kg/m². BMI is within normal parameters. No follow-up required..    RTO PRN     Geno Patrick, APRN  3/13/2020

## 2020-03-17 ENCOUNTER — TELEPHONE (OUTPATIENT)
Dept: OBSTETRICS AND GYNECOLOGY | Facility: CLINIC | Age: 35
End: 2020-03-17

## 2020-03-17 NOTE — TELEPHONE ENCOUNTER
I received notification from Geno that the patient is experiencing migraines with the Tabby IUD. Patient is interested in getting the Paragard IUD. I am checking benefits for the Paragard.

## 2020-03-20 ENCOUNTER — PATIENT MESSAGE (OUTPATIENT)
Dept: OBSTETRICS AND GYNECOLOGY | Facility: CLINIC | Age: 35
End: 2020-03-20

## 2020-03-23 ENCOUNTER — TELEPHONE (OUTPATIENT)
Dept: OBSTETRICS AND GYNECOLOGY | Facility: CLINIC | Age: 35
End: 2020-03-23

## 2020-03-23 NOTE — TELEPHONE ENCOUNTER
Paragard is not covered by medical or pharmacy benefits. I called the patient and left provided information on voice mail

## 2020-03-24 NOTE — TELEPHONE ENCOUNTER
Can we write a letter of medical necessity? She has failed multiple forms of contraception. She has migraine with aura and is not a candidate for estrogen. ( I know we have already talked about this but this is for the paper trail.)

## 2020-05-28 NOTE — PROGRESS NOTES
Alda Durham : 1985 MRN: [id number] DATE: 4/3/2019      CC:  2 weeks s/p closed treatment of left lateral malleolar fracture    HPI: Pt. returns to clinic today stating pain is improved.  Denies any new concerns or issues.  Her only complaint is that the boot is a little too tall    Vitals:    19 1509   Temp: 98.5 °F (36.9 °C)        Exam: Skin is benign.  Calf soft.  Negative Sonia's sign.  Mild tenderness over lateral malleolus.  Good motor and sensory function distally in foot.  Palpable pulses with good cap refill.      Imaging:  3 view X-rays of left ankle including AP, lateral and mortise views are ordered and reviewed by me to evaluate alignment and for comparison purposes.  No change in alignment.  Fracture remains nondisplaced.    Impression:  2 weeks s/p closed treatment of lateral malleolar fracture    Plan:    I fitted her with a smaller boot today.  She can bear weight as tolerated.  I gave her a work note for limited standing and walking.  I will see her back in 3 weeks.    Kervin Boyce MD           Completed proxy request

## 2021-04-16 ENCOUNTER — BULK ORDERING (OUTPATIENT)
Dept: CASE MANAGEMENT | Facility: OTHER | Age: 36
End: 2021-04-16

## 2021-04-16 DIAGNOSIS — Z23 IMMUNIZATION DUE: ICD-10-CM

## 2021-04-27 ENCOUNTER — OFFICE VISIT (OUTPATIENT)
Dept: OBSTETRICS AND GYNECOLOGY | Facility: CLINIC | Age: 36
End: 2021-04-27

## 2021-04-27 VITALS
SYSTOLIC BLOOD PRESSURE: 112 MMHG | HEIGHT: 63 IN | WEIGHT: 152.6 LBS | BODY MASS INDEX: 27.04 KG/M2 | DIASTOLIC BLOOD PRESSURE: 78 MMHG

## 2021-04-27 DIAGNOSIS — Z01.419 PAP SMEAR, LOW-RISK: ICD-10-CM

## 2021-04-27 DIAGNOSIS — Z11.51 ENCOUNTER FOR SCREENING FOR HUMAN PAPILLOMAVIRUS (HPV): ICD-10-CM

## 2021-04-27 DIAGNOSIS — Z01.419 ROUTINE GYNECOLOGICAL EXAMINATION: Primary | ICD-10-CM

## 2021-04-27 PROBLEM — Z30.09 ENCOUNTER FOR COUNSELING REGARDING CONTRACEPTION: Status: RESOLVED | Noted: 2020-03-13 | Resolved: 2021-04-27

## 2021-04-27 PROBLEM — Z30.432 ENCOUNTER FOR IUD REMOVAL: Status: RESOLVED | Noted: 2020-03-13 | Resolved: 2021-04-27

## 2021-04-27 PROBLEM — Z30.430 ENCOUNTER FOR IUD INSERTION: Status: RESOLVED | Noted: 2020-03-09 | Resolved: 2021-04-27

## 2021-04-27 PROCEDURE — 99395 PREV VISIT EST AGE 18-39: CPT | Performed by: OBSTETRICS & GYNECOLOGY

## 2021-04-27 PROCEDURE — 81002 URINALYSIS NONAUTO W/O SCOPE: CPT | Performed by: OBSTETRICS & GYNECOLOGY

## 2021-04-27 PROCEDURE — 81025 URINE PREGNANCY TEST: CPT | Performed by: OBSTETRICS & GYNECOLOGY

## 2021-04-27 NOTE — PROGRESS NOTES
GYN Annual Exam     CC- Here for annual exam.     Alda Phan is a 36 y.o. female who presents for annual well woman exam. Periods are regular every 28-30 days, lasting 7 days. Dysmenorrhea:moderate, occurring throughout menses. Cyclic symptoms include none. No intermenstrual bleeding, spotting, or discharge.  Patient is sexually active  yes - boyfriend . Patient is satisfied with her contraception yes - ParaGard 21. Pt got ParaGard IUD at [facility]. She has hx of migraines with aura.    OB History    No obstetric history on file.     : EAB 2020    Current contraception: IUD  History of abnormal Pap smear: no  History of abnormal mammogram: no  Family history of uterine, colon or ovarian cancer: no  Family history of breast cancer: yes - maternal GM over age 50.    Health Maintenance   Topic Date Due   • ANNUAL PHYSICAL  Never done   • COVID-19 Vaccine (1) Never done   • TDAP/TD VACCINES (1 - Tdap) Never done   • Annual Gynecologic Pelvic and Breast Exam  2020   • INFLUENZA VACCINE  2021   • PAP SMEAR  2022   • HEPATITIS C SCREENING  Completed   • Pneumococcal Vaccine 0-64  Aged Out       Past Medical History:   Diagnosis Date   • Anxiety    • Asthma    • CTS (carpal tunnel syndrome)    • Fractures    • Migraine        Past Surgical History:   Procedure Laterality Date   • DENTAL PROCEDURE           Current Outpatient Medications:   •  fexofenadine-pseudoephedrine (ALLEGRA-D 24) 180-240 MG per 24 hr tablet, Take 1 tablet by mouth Daily., Disp: , Rfl:   •  Vortioxetine HBr (TRINTELLIX) 20 MG tablet, Take  by mouth., Disp: , Rfl:     Allergies   Allergen Reactions   • Celexa [Citalopram Hydrobromide] Swelling   • Fioricet [Butalbital-Apap-Caffeine] Swelling   • Lactose Intolerance (Gi) GI Intolerance   • Macrobid [Nitrofurantoin] Swelling   • Other Swelling     Muscle relaxers   • Robaxin [Methocarbamol] Swelling   • Soma [Carisoprodol] Swelling   • Sulfa  "Antibiotics Swelling   • Sumatriptan Swelling   • Tramadol Swelling       Social History     Tobacco Use   • Smoking status: Former Smoker     Years: 1.00     Start date: 2001     Quit date: 2002     Years since quittin.2   • Smokeless tobacco: Never Used   • Tobacco comment: Social smoker   Substance Use Topics   • Alcohol use: No   • Drug use: No       Family History   Problem Relation Age of Onset   • Cancer Maternal Grandmother    • Osteoporosis Maternal Grandmother    • Clotting disorder Mother         DVT   • Diabetes Paternal Grandmother    • Diabetes Paternal Grandfather        Review of Systems   Constitutional: Negative for appetite change, chills, fatigue, fever and unexpected weight change.   Gastrointestinal: Negative for abdominal distention, abdominal pain, anal bleeding, blood in stool, constipation, diarrhea, nausea and vomiting.   Genitourinary: Negative for dyspareunia, dysuria, menstrual problem, pelvic pain, vaginal bleeding, vaginal discharge and vaginal pain.       /78   Ht 160 cm (63\")   Wt 69.2 kg (152 lb 9.6 oz)   Breastfeeding No   BMI 27.03 kg/m²     Physical Exam  Vitals reviewed.   Constitutional:       Appearance: She is well-developed.   HENT:      Mouth/Throat:      Dentition: Normal dentition. No dental caries.   Cardiovascular:      Rate and Rhythm: Normal rate and regular rhythm.      Heart sounds: Normal heart sounds.   Pulmonary:      Effort: Pulmonary effort is normal. No respiratory distress.      Breath sounds: Normal breath sounds. No stridor. No wheezing.   Chest:      Breasts:         Right: No inverted nipple, mass, nipple discharge, skin change or tenderness.         Left: No inverted nipple, mass, nipple discharge, skin change or tenderness.   Abdominal:      General: There is no distension.      Palpations: Abdomen is soft. There is no mass.      Tenderness: There is no abdominal tenderness.   Genitourinary:     Labia:         Right: No rash, " tenderness or lesion.         Left: No rash, tenderness or lesion.       Vagina: Foreign body present. Bleeding present. No vaginal discharge or tenderness.      Cervix: No cervical motion tenderness, discharge or friability.      Uterus: Not deviated, not enlarged, not fixed and not tender.       Adnexa:         Right: No mass, tenderness or fullness.          Left: No mass, tenderness or fullness.        Comments: IUD string visualized  Musculoskeletal:         General: No tenderness. Normal range of motion.   Skin:     General: Skin is warm.      Findings: No erythema or rash.   Neurological:      Mental Status: She is alert and oriented to person, place, and time.      Cranial Nerves: No cranial nerve deficit.      Coordination: Coordination normal.   Psychiatric:         Behavior: Behavior normal.         Thought Content: Thought content normal.         Judgment: Judgment normal.            Assessment/Plan    1) GYN HM: Check pap smear   SBE demonstrated and encouraged.  2) STD screening: check full panel  3) Contraception: Paragard IUD placed 2/4/2021  4) Family Planning: does not want children  5) Diet and Exercise discussed  6) Smoking Status: nonsmoker  7) Migraines with aura: Not a candidate for estrogen  8) Follow up prn and 1 year       Diagnoses and all orders for this visit:    Routine gynecological examination  -     POC Urinalysis Dipstick  -     POC Pregnancy, Urine  -     RPR, Rfx Qn RPR / Confirm TP  -     Hepatitis B Surface Antigen  -     Hepatitis C Antibody  -     HIV-1 / O / 2 Ag / Antibody 4th Generation  -     HSV 1 & 2 - Specific Antibody, IgG    Pap smear, low-risk  -     IgP, Aptima HPV    Encounter for screening for human papillomavirus (HPV)  -     IgP, Aptima HPV          Payal Omalley DO  4/27/2021  13:18 EDT

## 2021-04-28 LAB
HBV SURFACE AG SERPL QL IA: NEGATIVE
HCV AB S/CO SERPL IA: <0.1 S/CO RATIO (ref 0–0.9)
HIV 1+2 AB+HIV1 P24 AG SERPL QL IA: NON REACTIVE
HSV1 IGG SER IA-ACNC: <0.91 INDEX (ref 0–0.9)
HSV2 IGG SER IA-ACNC: <0.91 INDEX (ref 0–0.9)
RPR SER QL: NON REACTIVE

## 2021-04-29 LAB
CYTOLOGIST CVX/VAG CYTO: NORMAL
CYTOLOGY CVX/VAG DOC CYTO: NORMAL
CYTOLOGY CVX/VAG DOC THIN PREP: NORMAL
DX ICD CODE: NORMAL
DX ICD CODE: NORMAL
HIV 1 & 2 AB SER-IMP: NORMAL
HPV I/H RISK 4 DNA CVX QL PROBE+SIG AMP: NEGATIVE
OTHER STN SPEC: NORMAL
PATHOLOGIST CVX/VAG CYTO: NORMAL
STAT OF ADQ CVX/VAG CYTO-IMP: NORMAL

## 2021-05-05 RX ORDER — FLUCONAZOLE 150 MG/1
150 TABLET ORAL DAILY
Qty: 1 TABLET | Refills: 0 | Status: SHIPPED | OUTPATIENT
Start: 2021-05-05

## 2021-05-05 RX ORDER — METRONIDAZOLE 500 MG/1
500 TABLET ORAL 2 TIMES DAILY
Qty: 14 TABLET | Refills: 0 | Status: SHIPPED | OUTPATIENT
Start: 2021-05-05 | End: 2021-05-06

## 2021-05-06 ENCOUNTER — TELEPHONE (OUTPATIENT)
Dept: OBSTETRICS AND GYNECOLOGY | Facility: CLINIC | Age: 36
End: 2021-05-06

## 2021-05-06 RX ORDER — METRONIDAZOLE 7.5 MG/G
GEL VAGINAL NIGHTLY
Qty: 70 G | Refills: 0 | Status: SHIPPED | OUTPATIENT
Start: 2021-05-06 | End: 2021-05-11

## 2021-05-06 NOTE — TELEPHONE ENCOUNTER
Ok. She can get monistat over the counter. May be cheaper than me prescribing. She should take the monistat after she takes the Metrogel vaginally.

## 2021-05-19 RX ORDER — FLUCONAZOLE 150 MG/1
150 TABLET ORAL DAILY
Qty: 1 TABLET | Refills: 0 | Status: SHIPPED | OUTPATIENT
Start: 2021-05-19

## 2022-06-21 ENCOUNTER — OFFICE VISIT (OUTPATIENT)
Dept: OBSTETRICS AND GYNECOLOGY | Facility: CLINIC | Age: 37
End: 2022-06-21

## 2022-06-21 VITALS
HEIGHT: 66 IN | WEIGHT: 149.8 LBS | BODY MASS INDEX: 24.08 KG/M2 | DIASTOLIC BLOOD PRESSURE: 88 MMHG | SYSTOLIC BLOOD PRESSURE: 126 MMHG

## 2022-06-21 DIAGNOSIS — Z11.51 ENCOUNTER FOR SCREENING FOR HUMAN PAPILLOMAVIRUS (HPV): ICD-10-CM

## 2022-06-21 DIAGNOSIS — Z01.419 PAP SMEAR, LOW-RISK: Primary | ICD-10-CM

## 2022-06-21 DIAGNOSIS — Z01.419 ROUTINE GYNECOLOGICAL EXAMINATION: ICD-10-CM

## 2022-06-21 LAB
BILIRUB BLD-MCNC: NEGATIVE MG/DL
CLARITY, POC: CLEAR
COLOR UR: YELLOW
GLUCOSE UR STRIP-MCNC: NEGATIVE MG/DL
KETONES UR QL: NEGATIVE
LEUKOCYTE EST, POC: NEGATIVE
NITRITE UR-MCNC: NEGATIVE MG/ML
PH UR: 5 [PH] (ref 5–8)
PROT UR STRIP-MCNC: NEGATIVE MG/DL
RBC # UR STRIP: NEGATIVE /UL
SP GR UR: 1 (ref 1–1.03)
UROBILINOGEN UR QL: NORMAL

## 2022-06-21 PROCEDURE — 99395 PREV VISIT EST AGE 18-39: CPT | Performed by: OBSTETRICS & GYNECOLOGY

## 2022-06-21 PROCEDURE — 81002 URINALYSIS NONAUTO W/O SCOPE: CPT | Performed by: OBSTETRICS & GYNECOLOGY

## 2022-06-21 NOTE — PROGRESS NOTES
GYN Annual Exam     CC- Here for annual exam.     Alda Phan is a 37 y.o. female who presents for annual well woman exam. Periods are regular every 28-30 days, lasting 7 days. Dysmenorrhea:moderate, occurring throughout menses. Cyclic symptoms include none. No intermenstrual bleeding, spotting, or discharge.  Patient is sexually active; not currently. Patient is satisfied with her contraception yes - ParaGard 21. Pt got ParaGard IUD at [facility]. She has hx of migraines with aura.    OB History    No obstetric history on file.     : EAB 2020    Current contraception: IUD  History of abnormal Pap smear: no  History of abnormal mammogram: no  Family history of uterine, colon or ovarian cancer: no  Family history of breast cancer: yes - maternal GM over age 50.    Health Maintenance   Topic Date Due   • ANNUAL PHYSICAL  Never done   • TDAP/TD VACCINES (1 - Tdap) Never done   • Annual Gynecologic Pelvic and Breast Exam  2022   • COVID-19 Vaccine (3 - Booster for Pfizer series) 2022   • INFLUENZA VACCINE  10/01/2022   • PAP SMEAR  2024   • HEPATITIS C SCREENING  Completed   • Pneumococcal Vaccine 0-64  Aged Out       Past Medical History:   Diagnosis Date   • Anxiety    • Asthma    • CTS (carpal tunnel syndrome)    • Fractures    • Migraine        Past Surgical History:   Procedure Laterality Date   • DENTAL PROCEDURE           Current Outpatient Medications:   •  fexofenadine-pseudoephedrine (ALLEGRA-D 24) 180-240 MG per 24 hr tablet, Take 1 tablet by mouth Daily., Disp: , Rfl:   •  fluconazole (Diflucan) 150 MG tablet, Take 1 tablet by mouth Daily., Disp: 1 tablet, Rfl: 0  •  fluconazole (Diflucan) 150 MG tablet, Take 1 tablet by mouth Daily., Disp: 1 tablet, Rfl: 0  •  Vortioxetine HBr (TRINTELLIX) 20 MG tablet, Take  by mouth., Disp: , Rfl:     Allergies   Allergen Reactions   • Celexa [Citalopram Hydrobromide] Swelling   • Fioricet [Butalbital-Apap-Caffeine]  "Swelling   • Lactose Intolerance (Gi) GI Intolerance   • Macrobid [Nitrofurantoin] Swelling   • Other Swelling     Muscle relaxers   • Robaxin [Methocarbamol] Swelling   • Soma [Carisoprodol] Swelling   • Sulfa Antibiotics Swelling   • Sumatriptan Swelling   • Tramadol Swelling       Social History     Tobacco Use   • Smoking status: Former Smoker     Years: 1.00     Start date: 2001     Quit date: 2002     Years since quittin.4   • Smokeless tobacco: Never Used   • Tobacco comment: Social smoker   Substance Use Topics   • Alcohol use: No   • Drug use: No       Family History   Problem Relation Age of Onset   • Cancer Maternal Grandmother    • Osteoporosis Maternal Grandmother    • Clotting disorder Mother         DVT   • Diabetes Paternal Grandmother    • Diabetes Paternal Grandfather        Review of Systems   Constitutional: Negative for appetite change, chills, fatigue, fever and unexpected weight change.   Gastrointestinal: Negative for abdominal distention, abdominal pain, anal bleeding, blood in stool, constipation, diarrhea, nausea and vomiting.   Genitourinary: Negative for dyspareunia, dysuria, menstrual problem, pelvic pain, vaginal bleeding, vaginal discharge and vaginal pain.       /88   Ht 167.6 cm (66\")   Wt 67.9 kg (149 lb 12.8 oz)   LMP 06/15/2022 (Exact Date) Comment: IUD  Breastfeeding No   BMI 24.18 kg/m²     Physical Exam  Vitals reviewed.   Constitutional:       General: She is not in acute distress.     Appearance: Normal appearance. She is well-developed. She is not ill-appearing, toxic-appearing or diaphoretic.   HENT:      Mouth/Throat:      Dentition: Normal dentition. No dental caries.   Cardiovascular:      Rate and Rhythm: Normal rate and regular rhythm.      Heart sounds: Normal heart sounds.   Pulmonary:      Effort: Pulmonary effort is normal. No respiratory distress.      Breath sounds: Normal breath sounds. No stridor. No wheezing.   Chest:   Breasts:      " Right: No inverted nipple, mass, nipple discharge, skin change or tenderness.      Left: No inverted nipple, mass, nipple discharge, skin change or tenderness.       Abdominal:      General: There is no distension.      Palpations: Abdomen is soft. There is no mass.      Tenderness: There is no abdominal tenderness.   Genitourinary:     General: Normal vulva.      Labia:         Right: No rash, tenderness or lesion.         Left: No rash, tenderness or lesion.       Urethra: No prolapse, urethral pain, urethral swelling or urethral lesion.      Vagina: Foreign body present. Bleeding present. No vaginal discharge or tenderness.      Cervix: No cervical motion tenderness, discharge or friability.      Uterus: Not deviated, not enlarged, not fixed and not tender.       Adnexa:         Right: No mass, tenderness or fullness.          Left: No mass, tenderness or fullness.        Rectum: No tenderness or external hemorrhoid.      Comments: IUD string visualized  Musculoskeletal:         General: No tenderness. Normal range of motion.   Skin:     General: Skin is warm.      Findings: No erythema or rash.   Neurological:      General: No focal deficit present.      Mental Status: She is alert and oriented to person, place, and time. Mental status is at baseline.      Cranial Nerves: No cranial nerve deficit.      Motor: No weakness.      Coordination: Coordination normal.      Gait: Gait normal.   Psychiatric:         Mood and Affect: Mood normal.         Behavior: Behavior normal.         Thought Content: Thought content normal.         Judgment: Judgment normal.            Assessment/Plan    1) GYN HM: Check pap smear. MMG age 38. SBE demonstrated and encouraged.  2) STD screening: check full panel  3) Contraception: Paragard IUD placed 2/4/2021  4) Family Planning: does not want children  5) Diet and Exercise discussed  6) Smoking Status: nonsmoker  7) Migraines with aura: Not a candidate for estrogen  8) Social: Pt  works at [employer]. Pt planning on applying for masters in Art therapy.  9) Follow up prn and 1 year       Diagnoses and all orders for this visit:    Routine gynecological examination  -     POC Urinalysis Dipstick  -     RPR, Rfx Qn RPR / Confirm TP  -     Hepatitis B Surface Antigen  -     Hepatitis C Antibody  -     HIV-1 / O / 2 Ag / Antibody 4th Generation  -     HSV 1 & 2 - Specific Antibody, IgG    Pap smear, low-risk  -     IgP, Aptima HPV    Encounter for screening for human papillomavirus (HPV)  -     IgP, Aptima HPV          Payal Omalley DO  6/21/2022  12:23 EDT

## 2022-06-27 LAB
C TRACH RRNA CVX QL NAA+PROBE: NEGATIVE
CYTOLOGIST CVX/VAG CYTO: ABNORMAL
CYTOLOGY CVX/VAG DOC CYTO: ABNORMAL
CYTOLOGY CVX/VAG DOC THIN PREP: ABNORMAL
DX ICD CODE: ABNORMAL
DX ICD CODE: ABNORMAL
HIV 1 & 2 AB SER-IMP: ABNORMAL
HPV I/H RISK 4 DNA CVX QL PROBE+SIG AMP: NEGATIVE
N GONORRHOEA RRNA CVX QL NAA+PROBE: NEGATIVE
OTHER STN SPEC: ABNORMAL
PATHOLOGIST CVX/VAG CYTO: ABNORMAL
RECOM F/U CVX/VAG CYTO: ABNORMAL
STAT OF ADQ CVX/VAG CYTO-IMP: ABNORMAL
T VAGINALIS RRNA SPEC QL NAA+PROBE: NEGATIVE

## 2023-02-13 DIAGNOSIS — I87.2 SAPHENOFEMORAL VENOUS REFLUX: Primary | ICD-10-CM

## 2023-02-13 RX ORDER — HYDROCODONE BITARTRATE AND ACETAMINOPHEN 5; 325 MG/1; MG/1
1 TABLET ORAL EVERY 4 HOURS PRN
Qty: 20 TABLET | Refills: 0 | Status: SHIPPED | OUTPATIENT
Start: 2023-02-13

## 2023-02-13 RX ORDER — HYDROCODONE BITARTRATE AND ACETAMINOPHEN 5; 325 MG/1; MG/1
1 TABLET ORAL EVERY 4 HOURS PRN
Qty: 20 TABLET | Refills: 0 | Status: SHIPPED | OUTPATIENT
Start: 2023-02-13 | End: 2023-02-13

## 2023-02-27 DIAGNOSIS — I87.2 SAPHENOFEMORAL VENOUS REFLUX: ICD-10-CM

## 2023-02-27 RX ORDER — HYDROCODONE BITARTRATE AND ACETAMINOPHEN 5; 325 MG/1; MG/1
1 TABLET ORAL EVERY 4 HOURS PRN
Qty: 20 TABLET | Refills: 0 | Status: SHIPPED | OUTPATIENT
Start: 2023-02-27

## 2023-08-16 ENCOUNTER — OFFICE VISIT (OUTPATIENT)
Dept: OBSTETRICS AND GYNECOLOGY | Facility: CLINIC | Age: 38
End: 2023-08-16
Payer: COMMERCIAL

## 2023-08-16 VITALS
WEIGHT: 156 LBS | DIASTOLIC BLOOD PRESSURE: 82 MMHG | HEIGHT: 66 IN | SYSTOLIC BLOOD PRESSURE: 110 MMHG | BODY MASS INDEX: 25.07 KG/M2

## 2023-08-16 DIAGNOSIS — Z11.51 ENCOUNTER FOR SCREENING FOR HUMAN PAPILLOMAVIRUS (HPV): ICD-10-CM

## 2023-08-16 DIAGNOSIS — Z86.718 HISTORY OF DVT (DEEP VEIN THROMBOSIS): ICD-10-CM

## 2023-08-16 DIAGNOSIS — R14.0 BLOATING: ICD-10-CM

## 2023-08-16 DIAGNOSIS — Z01.419 ROUTINE GYNECOLOGICAL EXAMINATION: ICD-10-CM

## 2023-08-16 DIAGNOSIS — Z12.31 ENCOUNTER FOR SCREENING MAMMOGRAM FOR MALIGNANT NEOPLASM OF BREAST: ICD-10-CM

## 2023-08-16 DIAGNOSIS — G43.109 MIGRAINE WITH AURA AND WITHOUT STATUS MIGRAINOSUS, NOT INTRACTABLE: ICD-10-CM

## 2023-08-16 DIAGNOSIS — L68.0 FEMALE HIRSUTISM: ICD-10-CM

## 2023-08-16 DIAGNOSIS — Z01.419 PAP SMEAR, LOW-RISK: Primary | ICD-10-CM

## 2023-08-16 DIAGNOSIS — Z23 NEED FOR HPV VACCINATION: ICD-10-CM

## 2023-08-16 NOTE — PROGRESS NOTES
GYN Annual Exam     CC- Here for annual exam.     Alda hPan is a 38 y.o. female established patient of practice who is new to me who presents for annual well woman exam.  She last saw Dr Omalley in 2022. Periods are regular every 28-30 days, lasting 5 days. She has a Paragurd IUD placed in 2021. She c/o hair growth and bloating. Her US today shows a 7.5 cm AV uterus with an EL 1.5 cm and the IUD is in the correct position. Her ovaries appear normal. She has some CDS fluid that measures 2.3 x 2 x 0.7 cm. There is no comparable data. She is interested in Gardasil vaccine. She had a DVT and a family history of SHYAM and is interested in testing for clotting disorders.     OB History          1    Para        Term                AB   1    Living   0         SAB        IAB   1    Ectopic        Molar        Multiple        Live Births              Obstetric Comments   1 VIP               Menarche: 10  Current contraception: IUD Paraguard and 2021  History of abnormal Pap smear: no  History of abnormal mammogram: no  Family history of uterine, colon or ovarian cancer: no  Family history of breast cancer: yes - MGM and M great aunt > 50  H/o STDs: none  Last pap:2022- ASCUS neg HPV  Gardasil:missed  SHYAM:  self, DVT. DAD and MOM DVT  Migraines with aura    Health Maintenance   Topic Date Due    TDAP/TD VACCINES (1 - Tdap) Never done    ANNUAL PHYSICAL  Never done    COVID-19 Vaccine (3 - Pfizer series) 2022    Annual Gynecologic Pelvic and Breast Exam  2023    INFLUENZA VACCINE  10/01/2023    PAP SMEAR  2025    HEPATITIS C SCREENING  Completed    Pneumococcal Vaccine 0-64  Aged Out       Past Medical History:   Diagnosis Date    Anxiety     Asthma     CTS (carpal tunnel syndrome)     Deep vein thrombosis     Fractures     Migraine     with aua       Past Surgical History:   Procedure Laterality Date    DENTAL PROCEDURE      ENDOVENOUS ABLATION SAPHENOUS VEIN W/ LASER       WISDOM TOOTH EXTRACTION           Current Outpatient Medications:     fexofenadine-pseudoephedrine (ALLEGRA-D 24) 180-240 MG per 24 hr tablet, Take 1 tablet by mouth Daily., Disp: , Rfl:     Vortioxetine HBr (TRINTELLIX) 20 MG tablet, Take  by mouth., Disp: , Rfl:     Allergies   Allergen Reactions    Celexa [Citalopram Hydrobromide] Swelling    Fioricet [Butalbital-Apap-Caffeine] Swelling    Lactose Intolerance (Gi) GI Intolerance    Macrobid [Nitrofurantoin] Swelling    Other Swelling     Muscle relaxers    Robaxin [Methocarbamol] Swelling    Soma [Carisoprodol] Swelling    Sulfa Antibiotics Swelling    Sumatriptan Swelling    Tramadol Swelling       Social History     Tobacco Use    Smoking status: Former     Years: 1.00     Types: Cigarettes     Start date: 2001     Quit date: 2002     Years since quittin.5    Smokeless tobacco: Never    Tobacco comments:     Social smoker   Vaping Use    Vaping Use: Never used   Substance Use Topics    Alcohol use: No    Drug use: No       Family History   Problem Relation Age of Onset    Deep vein thrombosis Father     Deep vein thrombosis Mother     Clotting disorder Mother         DVT    Diabetes Paternal Grandfather     Diabetes Paternal Grandmother     Breast cancer Maternal Grandmother     Cancer Maternal Grandmother     Osteoporosis Maternal Grandmother     Breast cancer Other     Ovarian cancer Neg Hx     Uterine cancer Neg Hx     Colon cancer Neg Hx        Review of Systems   Constitutional:  Positive for activity change. Negative for appetite change, fatigue, fever and unexpected weight change.   Eyes:  Negative for photophobia and visual disturbance.   Respiratory:  Negative for cough and shortness of breath.    Cardiovascular:  Negative for chest pain and palpitations.   Gastrointestinal:  Positive for abdominal distention. Negative for abdominal pain, constipation, diarrhea and nausea.   Endocrine: Negative for cold intolerance and heat intolerance.  "  Genitourinary:  Negative for dyspareunia, dysuria, menstrual problem, pelvic pain, vaginal bleeding and vaginal discharge.   Musculoskeletal:  Negative for back pain.   Skin:  Negative for color change and rash.        + hair growth   Neurological:  Negative for headaches.   Hematological:  Negative for adenopathy. Does not bruise/bleed easily.   Psychiatric/Behavioral:  Negative for dysphoric mood. The patient is not nervous/anxious.      /82   Ht 167.6 cm (66\")   Wt 70.8 kg (156 lb)   LMP 07/20/2023 (Exact Date)   BMI 25.18 kg/mý     Physical Exam  Vitals and nursing note reviewed. Exam conducted with a chaperone present.   Constitutional:       Appearance: Normal appearance. She is well-developed and normal weight.   HENT:      Head: Normocephalic and atraumatic.   Eyes:      General: No scleral icterus.     Conjunctiva/sclera: Conjunctivae normal.   Neck:      Thyroid: No thyromegaly.   Cardiovascular:      Rate and Rhythm: Normal rate and regular rhythm.   Pulmonary:      Effort: Pulmonary effort is normal.      Breath sounds: Normal breath sounds.   Chest:   Breasts:     Right: No swelling, bleeding, inverted nipple, mass, nipple discharge, skin change or tenderness.      Left: No swelling, bleeding, inverted nipple, mass, nipple discharge, skin change or tenderness.   Abdominal:      General: Bowel sounds are normal. There is no distension.      Palpations: Abdomen is soft. There is no mass.      Tenderness: There is no abdominal tenderness. There is no guarding or rebound.      Hernia: No hernia is present.   Genitourinary:     Exam position: Supine.      Labia:         Right: No rash, tenderness or lesion.         Left: No rash, tenderness or lesion.       Urethra: No prolapse, urethral pain, urethral swelling or urethral lesion.      Vagina: No signs of injury and foreign body. No vaginal discharge, erythema, tenderness or bleeding.      Cervix: No cervical motion tenderness, discharge or " friability.      Uterus: Not deviated, not enlarged, not fixed and not tender.       Adnexa:         Right: No mass, tenderness or fullness.          Left: No mass, tenderness or fullness.        Comments: IUD string seen  Musculoskeletal:      Cervical back: Neck supple.   Skin:     General: Skin is warm and dry.   Neurological:      Mental Status: She is alert and oriented to person, place, and time.   Psychiatric:         Mood and Affect: Mood normal.         Behavior: Behavior normal.         Thought Content: Thought content normal.         Judgment: Judgment normal.          Assessment/Plan    1) GYN HM: pap/HPV  SBE demonstrated and encouraged.  2) STD screening: declines Condoms encouraged.  3) Contraception: IUD Paraguard 2/2021  4) Family Planning: family planning: childbearing completed, encourage folic acid daily  5) Diet and Exercise discussed  6) Smoking Status: No  7) H/O DVT- check thrombophilia panel  8) MMG-  due, will schedule  9)Cscope- plan age 43  10) Bloating- normal pelvic US. Pt declines GI referral  11) Hair growth - check free testosterone  12) Migraines with aura are a contraindication to birth control containing estrogen or hormone replacement therapies that contain estrogen.  These drugs can increase the risk of stroke for those with these types of migraines, even in a very young and otherwise healthy patient.  Birth control methods that contain estrogen include birth control pills, patches and rings.  13) Discussed with patient risks, benefits and alternatives to the Gardasil vaccination.  The vaccine is administered in the arm in a series of 3 shots at 02 in 6 months.  It provides a 70 to 90% reduction in HPV related diseases such as abnormal Pap smears, genital warts and cervical cancer.  The vaccine was originally approved for ages 9-26, but is recently been expanded to the age of 45.  The most common side effects are pain at the injection site and fainting.  Any and all adverse side  effects are tracked by the FDA and are available on their website for review.  After consideration, the patient plans first dose today.RTO in 2 & 6 months for second and third gardasil shots ( nurse only)  14)Follow up prn or 1 year  15)  I spent > 30minutes on the separately reported service of E& M.  This time includes time spent by me in the following activities: preparing for the visit, reviewing tests, obtaining and/or reviewing a separately obtained history, performing a medically appropriate examination and/or evaluation, counseling and educating the patient/family/caregiver, ordering medications, tests, or procedures, referring and communicating with other health care professionals, documenting information in the medical record, independently interpreting results and communicating that information with the patient/family/caregiver and care coordination. This time is not included in the time used to interpret the ultrasound also reported today.         Diagnoses and all orders for this visit:    1. Pap smear, low-risk (Primary)  -     IGP, Apt HPV,rfx 16 / 18,45    2. Routine gynecological examination  -     POC Urinalysis Dipstick  -     IGP, Apt HPV,rfx 16 / 18,45    3. History of DVT (deep vein thrombosis)  -     SUAD  -     Anticardiolipin Antibody, IgG / M, Qn  -     Antithrombin III  -     Factor 5 Leiden  -     Factor II, DNA Analysis  -     Protein C Deficiency Profile  -     Protein S Panel    4. Female hirsutism  -     Testosterone Free Direct    5. Encounter for screening for human papillomavirus (HPV)  -     IGP, Apt HPV,rfx 16 / 18,45    6. Need for HPV vaccination  -     HPV 9-Valent Recomb Vaccine suspension 0.5 mL    7. Bloating    8. Migraine with aura and without status migrainosus, not intractable    9. Encounter for screening mammogram for malignant neoplasm of breast  -     Mammo Screening Digital Tomosynthesis Bilateral With CAD; Future          Darby Aleksandra Moses,  MD  08/16/2023    14:22 EDT

## 2023-08-21 LAB
CYTOLOGIST CVX/VAG CYTO: NORMAL
CYTOLOGY CVX/VAG DOC CYTO: NORMAL
CYTOLOGY CVX/VAG DOC THIN PREP: NORMAL
DX ICD CODE: NORMAL
HIV 1 & 2 AB SER-IMP: NORMAL
HPV I/H RISK 4 DNA CVX QL PROBE+SIG AMP: NEGATIVE
OTHER STN SPEC: NORMAL
STAT OF ADQ CVX/VAG CYTO-IMP: NORMAL

## 2023-08-22 RX ORDER — METRONIDAZOLE 500 MG/1
500 TABLET ORAL 2 TIMES DAILY
Qty: 14 TABLET | Refills: 0 | Status: SHIPPED | OUTPATIENT
Start: 2023-08-22 | End: 2023-08-25 | Stop reason: ALTCHOICE

## 2023-08-23 LAB
ANA SER QL: NEGATIVE
AT III ACT/NOR PPP CHRO: 107 % (ref 75–135)
CARDIOLIPIN IGG SER IA-ACNC: <9 GPL U/ML (ref 0–14)
CARDIOLIPIN IGM SER IA-ACNC: <9 MPL U/ML (ref 0–12)
F2 C.20210G>A GENO BLD/T: NORMAL
F5 P.R506Q BLD/T QL: NORMAL
IMP & REVIEW OF LAB RESULTS: NORMAL
IMP & REVIEW OF LAB RESULTS: NORMAL
PROT C ACT/NOR PPP: 108 % (ref 73–180)
PROT C AG ACT/NOR PPP IA: 92 % (ref 60–150)
PROT S ACT/NOR PPP: 70 % (ref 63–140)
PROT S AG ACT/NOR PPP IA: 63 % (ref 60–150)
PROT S FREE AG ACT/NOR PPP IA: 84 % (ref 61–136)
TESTOST FREE SERPL-MCNC: 0.6 PG/ML (ref 0–4.2)

## 2023-08-25 RX ORDER — METRONIDAZOLE 7.5 MG/G
GEL VAGINAL NIGHTLY
Qty: 70 G | Refills: 0 | Status: SHIPPED | OUTPATIENT
Start: 2023-08-25 | End: 2023-08-30

## 2023-12-19 ENCOUNTER — CLINICAL SUPPORT (OUTPATIENT)
Dept: OBSTETRICS AND GYNECOLOGY | Facility: CLINIC | Age: 38
End: 2023-12-19
Payer: COMMERCIAL

## 2023-12-19 VITALS — HEIGHT: 66 IN | BODY MASS INDEX: 25.1 KG/M2 | WEIGHT: 156.2 LBS

## 2023-12-19 DIAGNOSIS — Z23 NEED FOR HPV VACCINATION: Primary | ICD-10-CM

## 2023-12-19 RX ORDER — HYDROXYZINE HYDROCHLORIDE 25 MG/1
TABLET, FILM COATED ORAL
COMMUNITY
Start: 2023-08-23

## 2023-12-19 RX ORDER — VORTIOXETINE 10 MG/1
TABLET, FILM COATED ORAL
COMMUNITY
Start: 2023-08-24

## 2024-01-18 ENCOUNTER — HOSPITAL ENCOUNTER (OUTPATIENT)
Dept: MAMMOGRAPHY | Facility: HOSPITAL | Age: 39
Discharge: HOME | End: 2024-01-18
Admitting: OBSTETRICS & GYNECOLOGY

## 2024-01-18 DIAGNOSIS — Z12.31 ENCOUNTER FOR SCREENING MAMMOGRAM FOR MALIGNANT NEOPLASM OF BREAST: ICD-10-CM

## 2024-01-18 PROCEDURE — 77067 SCR MAMMO BI INCL CAD: CPT

## 2024-01-18 PROCEDURE — 77063 BREAST TOMOSYNTHESIS BI: CPT

## 2025-04-18 ENCOUNTER — OFFICE VISIT (OUTPATIENT)
Dept: OBSTETRICS AND GYNECOLOGY | Facility: CLINIC | Age: 40
End: 2025-04-18
Payer: COMMERCIAL

## 2025-04-18 VITALS
DIASTOLIC BLOOD PRESSURE: 80 MMHG | SYSTOLIC BLOOD PRESSURE: 114 MMHG | HEIGHT: 66 IN | BODY MASS INDEX: 26.03 KG/M2 | WEIGHT: 162 LBS

## 2025-04-18 DIAGNOSIS — Z11.51 SPECIAL SCREENING EXAMINATION FOR HUMAN PAPILLOMAVIRUS (HPV): ICD-10-CM

## 2025-04-18 DIAGNOSIS — Z12.31 ENCOUNTER FOR SCREENING MAMMOGRAM FOR MALIGNANT NEOPLASM OF BREAST: ICD-10-CM

## 2025-04-18 DIAGNOSIS — Z01.419 CERVICAL SMEAR, AS PART OF ROUTINE GYNECOLOGICAL EXAMINATION: Primary | ICD-10-CM

## 2025-04-18 DIAGNOSIS — Z01.419 ROUTINE GYNECOLOGICAL EXAMINATION: ICD-10-CM

## 2025-04-18 LAB
B-HCG UR QL: NEGATIVE
BILIRUB BLD-MCNC: NEGATIVE MG/DL
CLARITY, POC: CLEAR
COLOR UR: YELLOW
EXPIRATION DATE: NORMAL
GLUCOSE UR STRIP-MCNC: NEGATIVE MG/DL
INTERNAL NEGATIVE CONTROL: NORMAL
INTERNAL POSITIVE CONTROL: NORMAL
KETONES UR QL: NEGATIVE
LEUKOCYTE EST, POC: NEGATIVE
Lab: NORMAL
NITRITE UR-MCNC: NEGATIVE MG/ML
PH UR: 5 [PH] (ref 5–8)
PROT UR STRIP-MCNC: ABNORMAL MG/DL
RBC # UR STRIP: ABNORMAL /UL
SP GR UR: 1.01 (ref 1–1.03)
UROBILINOGEN UR QL: ABNORMAL

## 2025-04-18 NOTE — PROGRESS NOTES
GYN Annual Exam     CC- Here for annual exam.     Alda Phan is a 40 y.o. female est pt here for annual exam.  She had a DVT and a family history of SHYAM and her testing for clotting disorders was normal. She is graduating from art therapy school. She has had 2/3 Gardasil and does not want her last dose.     OB History          1    Para   0    Term   0            AB   1    Living   0         SAB        IAB   1    Ectopic        Molar        Multiple        Live Births              Obstetric Comments   1 VIP               Menarche: 10  Current contraception: IUD Paraguard and 2021  History of abnormal Pap smear: no  History of abnormal mammogram: no  Family history of uterine, colon or ovarian cancer: no  Family history of breast cancer: yes - MGM and M great aunt > 50  H/o STDs: none  Last pap:2023- nl pap/HPV  Gardasil:2/3  SHYAM:  self, DVT. DAD and MOM DVT - pt had neg thrombophilia panel   Migraines with aura    Health Maintenance   Topic Date Due    TDAP/TD VACCINES (1 - Tdap) Never done    ANNUAL PHYSICAL  Never done    COVID-19 Vaccine (3 - - season) 2024    INFLUENZA VACCINE  2025    MAMMOGRAM  2026    Annual Gynecologic Pelvic and Breast Exam  2026    PAP SMEAR  2028    HEPATITIS C SCREENING  Completed    Pneumococcal Vaccine 0-49  Aged Out       Past Medical History:   Diagnosis Date    Anxiety     Asthma     CTS (carpal tunnel syndrome)     Deep vein thrombosis     Fractures     Migraine     with aua       Past Surgical History:   Procedure Laterality Date    DENTAL PROCEDURE      ENDOVENOUS ABLATION SAPHENOUS VEIN W/ LASER      WISDOM TOOTH EXTRACTION           Current Outpatient Medications:     fexofenadine-pseudoephedrine (ALLEGRA-D 24) 180-240 MG per 24 hr tablet, Take 1 tablet by mouth Daily., Disp: , Rfl:     hydrOXYzine (ATARAX) 25 MG tablet, TAKE 1/2 TO 1 TABLET BY ORAL ROUTE 3 TIMES EVERY DAY AS NEEDED, Disp: , Rfl:      Trintellix 10 MG tablet tablet, TAKE 1 TABLET BY MOUTH EVERY DAY AT THE SAME TIME EACH DAY, Disp: , Rfl:     Allergies   Allergen Reactions    Celexa [Citalopram Hydrobromide] Swelling    Fioricet [Butalbital-Apap-Caffeine] Swelling    Lactose Intolerance (Gi) GI Intolerance    Macrobid [Nitrofurantoin] Swelling    Other Swelling     Muscle relaxers    Robaxin [Methocarbamol] Swelling    Soma [Carisoprodol] Swelling    Sulfa Antibiotics Swelling    Sumatriptan Swelling    Tramadol Swelling       Social History     Tobacco Use    Smoking status: Former     Current packs/day: 0.00     Types: Cigarettes     Start date: 2001     Quit date: 2002     Years since quittin.3    Smokeless tobacco: Never    Tobacco comments:     Social smoker   Vaping Use    Vaping status: Never Used   Substance Use Topics    Alcohol use: No    Drug use: No       Family History   Problem Relation Age of Onset    Deep vein thrombosis Father     Deep vein thrombosis Mother     Clotting disorder Mother         DVT    Diabetes Paternal Grandfather     Diabetes Paternal Grandmother     Breast cancer Maternal Grandmother     Cancer Maternal Grandmother     Osteoporosis Maternal Grandmother     Breast cancer Other     Ovarian cancer Neg Hx     Uterine cancer Neg Hx     Colon cancer Neg Hx        Review of Systems   Constitutional:  Negative for activity change, appetite change, fatigue, fever and unexpected weight change.   Eyes:  Negative for photophobia and visual disturbance.   Respiratory:  Negative for cough and shortness of breath.    Cardiovascular:  Negative for chest pain and palpitations.   Gastrointestinal:  Negative for abdominal distention, abdominal pain, constipation, diarrhea and nausea.   Endocrine: Negative for cold intolerance and heat intolerance.   Genitourinary:  Negative for dyspareunia, dysuria, menstrual problem, pelvic pain, vaginal bleeding and vaginal discharge.   Musculoskeletal:  Negative for back pain.  "  Skin:  Negative for color change and rash.        + hair growth   Neurological:  Negative for headaches.   Hematological:  Negative for adenopathy. Does not bruise/bleed easily.   Psychiatric/Behavioral:  Negative for dysphoric mood. The patient is not nervous/anxious.        /80   Ht 167.6 cm (65.98\")   Wt 73.5 kg (162 lb)   LMP 04/06/2025 (Approximate)   Breastfeeding No   BMI 26.16 kg/m²     Physical Exam  Vitals and nursing note reviewed. Exam conducted with a chaperone present.   Constitutional:       Appearance: Normal appearance. She is well-developed and normal weight.   HENT:      Head: Normocephalic and atraumatic.   Eyes:      General: No scleral icterus.     Conjunctiva/sclera: Conjunctivae normal.   Neck:      Thyroid: No thyromegaly.   Cardiovascular:      Rate and Rhythm: Normal rate and regular rhythm.   Pulmonary:      Effort: Pulmonary effort is normal.      Breath sounds: Normal breath sounds.   Chest:   Breasts:     Right: No swelling, bleeding, inverted nipple, mass, nipple discharge, skin change or tenderness.      Left: No swelling, bleeding, inverted nipple, mass, nipple discharge, skin change or tenderness.   Abdominal:      General: Bowel sounds are normal. There is no distension.      Palpations: Abdomen is soft. There is no mass.      Tenderness: There is no abdominal tenderness. There is no guarding or rebound.      Hernia: No hernia is present.   Genitourinary:     Exam position: Supine.      Labia:         Right: No rash, tenderness or lesion.         Left: No rash, tenderness or lesion.       Urethra: No prolapse, urethral pain, urethral swelling or urethral lesion.      Vagina: No signs of injury and foreign body. No vaginal discharge, erythema, tenderness or bleeding.      Cervix: No cervical motion tenderness, discharge or friability.      Uterus: Not deviated, not enlarged, not fixed and not tender.       Adnexa:         Right: No mass, tenderness or fullness.         "  Left: No mass, tenderness or fullness.        Comments: IUD string seen  Musculoskeletal:      Cervical back: Neck supple.   Skin:     General: Skin is warm and dry.   Neurological:      Mental Status: She is alert and oriented to person, place, and time.   Psychiatric:         Mood and Affect: Mood normal.         Behavior: Behavior normal.         Thought Content: Thought content normal.         Judgment: Judgment normal.            Assessment/Plan    1) GYN HM: pap/HPV  SBE demonstrated and encouraged.  2) STD screening: declines Condoms encouraged.  3) Contraception: IUD Paraguard 2/2021  4) Family Planning: family planning: childbearing completed, encourage folic acid daily  5) Diet and Exercise discussed  6) Smoking Status: No  7) Social: no issues  8) MMG- UTD 1/2024 B1. Schedule MMG now  9)Cscope- plan age 43  10) Migraines with aura are a contraindication to birth control containing estrogen or hormone replacement therapies that contain estrogen.  These drugs can increase the risk of stroke for those with these types of migraines, even in a very young and otherwise healthy patient.  Birth control methods that contain estrogen include birth control pills, patches and rings.  11) Discussed with patient risks, benefits and alternatives to the Gardasil vaccination.  The vaccine is administered in the arm in a series of 3 shots at 02 in 6 months.  It provides a 70 to 90% reduction in HPV related diseases such as abnormal Pap smears, genital warts and cervical cancer.  The vaccine was originally approved for ages 9-26, but is recently been expanded to the age of 45.  The most common side effects are pain at the injection site and fainting.  Any and all adverse side effects are tracked by the FDA and are available on their website for review.  Pt has had 2/3 doses and declines her last one.  12) Parts of this document have been copied or forwarded from her previous visits and have been reviewed, updated and  edited as indicated.   13)Follow up prn or 1 year           Diagnoses and all orders for this visit:    1. Cervical smear, as part of routine gynecological examination (Primary)  -     IGP, Apt HPV,rfx 16 / 18,45    2. Routine gynecological examination  -     POC Urinalysis Dipstick  -     POC Pregnancy, Urine  -     IGP, Apt HPV,rfx 16 / 18,45    3. Special screening examination for human papillomavirus (HPV)  -     IGP, Apt HPV,rfx 16 / 18,45    4. Encounter for screening mammogram for malignant neoplasm of breast  -     Mammo Screening Digital Tomosynthesis Bilateral With CAD; Future          Darby Aleksandra Moses MD  4/18/2025    20:27 EDT

## 2025-04-22 LAB
CYTOLOGIST CVX/VAG CYTO: NORMAL
CYTOLOGY CVX/VAG DOC CYTO: NORMAL
CYTOLOGY CVX/VAG DOC THIN PREP: NORMAL
DX ICD CODE: NORMAL
HPV I/H RISK 4 DNA CVX QL PROBE+SIG AMP: NEGATIVE
OTHER STN SPEC: NORMAL
SERVICE CMNT-IMP: NORMAL
STAT OF ADQ CVX/VAG CYTO-IMP: NORMAL

## 2025-06-19 ENCOUNTER — HOSPITAL ENCOUNTER (OUTPATIENT)
Dept: MAMMOGRAPHY | Facility: HOSPITAL | Age: 40
Discharge: HOME | End: 2025-06-19
Admitting: OBSTETRICS & GYNECOLOGY
Payer: COMMERCIAL

## 2025-06-19 DIAGNOSIS — Z12.31 ENCOUNTER FOR SCREENING MAMMOGRAM FOR MALIGNANT NEOPLASM OF BREAST: ICD-10-CM

## 2025-06-19 PROCEDURE — 77067 SCR MAMMO BI INCL CAD: CPT

## 2025-06-19 PROCEDURE — 77063 BREAST TOMOSYNTHESIS BI: CPT

## 2025-07-07 ENCOUNTER — OFFICE VISIT (OUTPATIENT)
Dept: OBSTETRICS AND GYNECOLOGY | Facility: CLINIC | Age: 40
End: 2025-07-07
Payer: COMMERCIAL

## 2025-07-07 VITALS
SYSTOLIC BLOOD PRESSURE: 112 MMHG | BODY MASS INDEX: 25.71 KG/M2 | HEIGHT: 66 IN | WEIGHT: 160 LBS | DIASTOLIC BLOOD PRESSURE: 76 MMHG

## 2025-07-07 DIAGNOSIS — N64.4 MASTODYNIA: Primary | ICD-10-CM

## 2025-07-07 PROCEDURE — 1159F MED LIST DOCD IN RCRD: CPT | Performed by: OBSTETRICS & GYNECOLOGY

## 2025-07-07 PROCEDURE — 99213 OFFICE O/P EST LOW 20 MIN: CPT | Performed by: OBSTETRICS & GYNECOLOGY

## 2025-07-07 PROCEDURE — 1160F RVW MEDS BY RX/DR IN RCRD: CPT | Performed by: OBSTETRICS & GYNECOLOGY

## 2025-07-07 RX ORDER — BUSPIRONE HYDROCHLORIDE 5 MG/1
5 TABLET ORAL 2 TIMES DAILY
COMMUNITY
Start: 2025-04-22

## 2025-07-07 NOTE — PROGRESS NOTES
"      Alda Phan is a 40 y.o. patient who presents for follow up of   Chief Complaint   Patient presents with    Breast Problem     Pain in left breast       40-year-old established patient here for left breast pain.  She has had approximately a month of left breast discomfort.  This feels like a sharp stabbing pain with some occasional burning sensation as well.  She denies any trauma to the breast.  She has not noticed any skin changes, lymphadenopathy or discrete masses.  She has not had any nipple discharge.  She drinks a cup of coffee and 1 soda a day.  She has been using ibuprofen for pain with some relief.  She says her pain is improved with cold packs and made worse with palpation and pressure on the left side.  She has not started on any vitamin E yet.  On 6/19/2025 she had a B0 mammogram with a left breast asymmetry.  She is scheduled for left diagnostic mammogram with ultrasound.  We will see her back in 1 month.  I have advised her to start vitamin E 400 units twice a day orally.  We if she is still having pain at her follow-up visit, we will then proceed with MRI of the breast.      The following portions of the patient's history were reviewed and updated as appropriate: allergies, current medications and problem list.    Review of Systems   Constitutional:  Negative for activity change.   Skin:  Negative for color change, rash and wound.        L breast pain       /76   Ht 167.6 cm (65.98\")   Wt 72.6 kg (160 lb)   LMP 06/10/2025   BMI 25.84 kg/m²     Physical Exam  Vitals and nursing note reviewed.   Constitutional:       Appearance: She is well-developed.   HENT:      Head: Normocephalic and atraumatic.   Eyes:      General: No scleral icterus.     Conjunctiva/sclera: Conjunctivae normal.   Neck:      Thyroid: No thyromegaly.   Chest:   Breasts:     Left: Tenderness present. No swelling, bleeding, inverted nipple, mass, nipple discharge or skin change.       Abdominal:      General: There " is no distension.      Palpations: Abdomen is soft. There is no mass.      Tenderness: There is no abdominal tenderness. There is no guarding or rebound.      Hernia: No hernia is present.   Skin:     General: Skin is warm and dry.   Neurological:      Mental Status: She is alert and oriented to person, place, and time.   Psychiatric:         Behavior: Behavior normal.         Thought Content: Thought content normal.         Judgment: Judgment normal.         A/P:  1. L mastodynia- MMG 6/2025 B0, diagnostic left mammogram with Chai and left breast ultrasound scheduled.  RTO 1 month to recheck symptoms.  If patient has normal imaging but still symptomatic, we will then proceed with breast MRI.  Encourage supportive bra, decrease caffeine by 50% and vitamin E 400 units twice daily orally.  2. [facility]- UTD annual 4/2025- nl pap/HPV    Assessment & Plan   Diagnoses and all orders for this visit:    1. Mastodynia (Primary)                 No follow-ups on file.      Darby Moses MD    7/7/2025  09:57 EDT

## 2025-07-15 ENCOUNTER — APPOINTMENT (OUTPATIENT)
Dept: WOMENS IMAGING | Facility: HOSPITAL | Age: 40
End: 2025-07-15
Payer: COMMERCIAL

## 2025-07-15 PROCEDURE — 77065 DX MAMMO INCL CAD UNI: CPT | Performed by: RADIOLOGY

## 2025-07-15 PROCEDURE — 77061 BREAST TOMOSYNTHESIS UNI: CPT | Performed by: RADIOLOGY

## 2025-07-15 PROCEDURE — G0279 TOMOSYNTHESIS, MAMMO: HCPCS | Performed by: RADIOLOGY

## 2025-07-15 PROCEDURE — 76642 ULTRASOUND BREAST LIMITED: CPT | Performed by: RADIOLOGY

## 2025-08-11 ENCOUNTER — OFFICE VISIT (OUTPATIENT)
Dept: OBSTETRICS AND GYNECOLOGY | Facility: CLINIC | Age: 40
End: 2025-08-11
Payer: COMMERCIAL

## 2025-08-11 VITALS
SYSTOLIC BLOOD PRESSURE: 110 MMHG | WEIGHT: 155 LBS | DIASTOLIC BLOOD PRESSURE: 70 MMHG | HEIGHT: 65 IN | BODY MASS INDEX: 25.83 KG/M2

## 2025-08-11 DIAGNOSIS — Z86.59 HISTORY OF CLAUSTROPHOBIA: ICD-10-CM

## 2025-08-11 DIAGNOSIS — Z13.89 SCREENING FOR GENITOURINARY CONDITION: Primary | ICD-10-CM

## 2025-08-11 DIAGNOSIS — N64.4 MASTODYNIA: ICD-10-CM

## 2025-08-11 LAB
B-HCG UR QL: NEGATIVE
BILIRUB BLD-MCNC: NEGATIVE MG/DL
CLARITY, POC: CLEAR
COLOR UR: YELLOW
EXPIRATION DATE: NORMAL
GLUCOSE UR STRIP-MCNC: NEGATIVE MG/DL
INTERNAL NEGATIVE CONTROL: NORMAL
INTERNAL POSITIVE CONTROL: NORMAL
KETONES UR QL: NEGATIVE
LEUKOCYTE EST, POC: NEGATIVE
Lab: NORMAL
NITRITE UR-MCNC: NEGATIVE MG/ML
PH UR: 5 [PH] (ref 5–8)
PROT UR STRIP-MCNC: NEGATIVE MG/DL
RBC # UR STRIP: NEGATIVE /UL
SP GR UR: 1 (ref 1–1.03)
UROBILINOGEN UR QL: NORMAL

## 2025-08-11 RX ORDER — LORAZEPAM 1 MG/1
TABLET ORAL
Qty: 2 TABLET | Refills: 0 | Status: SHIPPED | OUTPATIENT
Start: 2025-08-11